# Patient Record
Sex: FEMALE | Race: BLACK OR AFRICAN AMERICAN | NOT HISPANIC OR LATINO | Employment: FULL TIME | URBAN - METROPOLITAN AREA
[De-identification: names, ages, dates, MRNs, and addresses within clinical notes are randomized per-mention and may not be internally consistent; named-entity substitution may affect disease eponyms.]

---

## 2021-01-28 ENCOUNTER — APPOINTMENT (OUTPATIENT)
Dept: RESPIRATORY THERAPY | Facility: HOSPITAL | Age: 31
End: 2021-01-28

## 2021-01-28 ENCOUNTER — HOSPITAL ENCOUNTER (EMERGENCY)
Facility: HOSPITAL | Age: 31
Discharge: HOME | End: 2021-01-28
Admitting: EMERGENCY MEDICINE

## 2021-01-28 ENCOUNTER — APPOINTMENT (OUTPATIENT)
Dept: GENERAL RADIOLOGY | Facility: HOSPITAL | Age: 31
End: 2021-01-28

## 2021-01-28 VITALS
HEART RATE: 71 BPM | OXYGEN SATURATION: 100 % | DIASTOLIC BLOOD PRESSURE: 85 MMHG | BODY MASS INDEX: 30.28 KG/M2 | SYSTOLIC BLOOD PRESSURE: 128 MMHG | TEMPERATURE: 99 F | HEIGHT: 67 IN | WEIGHT: 192.9 LBS | RESPIRATION RATE: 16 BRPM

## 2021-01-28 DIAGNOSIS — R11.0 NAUSEA: ICD-10-CM

## 2021-01-28 DIAGNOSIS — R00.2 PALPITATIONS: Primary | ICD-10-CM

## 2021-01-28 LAB
AMPHET+METHAMPHET UR QL: NEGATIVE
ANION GAP SERPL CALCULATED.3IONS-SCNC: 10 MMOL/L (ref 5–15)
BARBITURATES UR QL SCN: NEGATIVE
BASOPHILS # BLD AUTO: 0 10*3/MM3 (ref 0–0.2)
BASOPHILS NFR BLD AUTO: 0.5 % (ref 0–1.5)
BENZODIAZ UR QL SCN: NEGATIVE
BUN SERPL-MCNC: 4 MG/DL (ref 6–20)
BUN/CREAT SERPL: 4.9 (ref 7–25)
CALCIUM SPEC-SCNC: 9.8 MG/DL (ref 8.6–10.5)
CANNABINOIDS SERPL QL: NEGATIVE
CHLORIDE SERPL-SCNC: 101 MMOL/L (ref 98–107)
CO2 SERPL-SCNC: 28 MMOL/L (ref 22–29)
COCAINE UR QL: NEGATIVE
CREAT SERPL-MCNC: 0.82 MG/DL (ref 0.57–1)
DEPRECATED RDW RBC AUTO: 48.6 FL (ref 37–54)
EOSINOPHIL # BLD AUTO: 0 10*3/MM3 (ref 0–0.4)
EOSINOPHIL NFR BLD AUTO: 0.8 % (ref 0.3–6.2)
ERYTHROCYTE [DISTWIDTH] IN BLOOD BY AUTOMATED COUNT: 18.8 % (ref 12.3–15.4)
GFR SERPL CREATININE-BSD FRML MDRD: 99 ML/MIN/1.73
GLUCOSE SERPL-MCNC: 111 MG/DL (ref 65–99)
HCT VFR BLD AUTO: 33 % (ref 34–46.6)
HGB BLD-MCNC: 10.3 G/DL (ref 12–15.9)
LYMPHOCYTES # BLD AUTO: 2.3 10*3/MM3 (ref 0.7–3.1)
LYMPHOCYTES NFR BLD AUTO: 42.2 % (ref 19.6–45.3)
MAGNESIUM SERPL-MCNC: 1.9 MG/DL (ref 1.6–2.6)
MCH RBC QN AUTO: 23 PG (ref 26.6–33)
MCHC RBC AUTO-ENTMCNC: 31.1 G/DL (ref 31.5–35.7)
MCV RBC AUTO: 74 FL (ref 79–97)
METHADONE UR QL SCN: NEGATIVE
MONOCYTES # BLD AUTO: 0.6 10*3/MM3 (ref 0.1–0.9)
MONOCYTES NFR BLD AUTO: 10.4 % (ref 5–12)
NEUTROPHILS NFR BLD AUTO: 2.6 10*3/MM3 (ref 1.7–7)
NEUTROPHILS NFR BLD AUTO: 46.1 % (ref 42.7–76)
NRBC BLD AUTO-RTO: 0.2 /100 WBC (ref 0–0.2)
OPIATES UR QL: NEGATIVE
OXYCODONE UR QL SCN: NEGATIVE
PLATELET # BLD AUTO: 316 10*3/MM3 (ref 140–450)
PMV BLD AUTO: 8.4 FL (ref 6–12)
POTASSIUM SERPL-SCNC: 3.9 MMOL/L (ref 3.5–5.2)
RBC # BLD AUTO: 4.46 10*6/MM3 (ref 3.77–5.28)
SODIUM SERPL-SCNC: 139 MMOL/L (ref 136–145)
TSH SERPL DL<=0.05 MIU/L-ACNC: 0.81 UIU/ML (ref 0.27–4.2)
WBC # BLD AUTO: 5.5 10*3/MM3 (ref 3.4–10.8)

## 2021-01-28 PROCEDURE — 80307 DRUG TEST PRSMV CHEM ANLYZR: CPT | Performed by: NURSE PRACTITIONER

## 2021-01-28 PROCEDURE — 84443 ASSAY THYROID STIM HORMONE: CPT | Performed by: NURSE PRACTITIONER

## 2021-01-28 PROCEDURE — 93005 ELECTROCARDIOGRAM TRACING: CPT

## 2021-01-28 PROCEDURE — 71045 X-RAY EXAM CHEST 1 VIEW: CPT

## 2021-01-28 PROCEDURE — 85025 COMPLETE CBC W/AUTO DIFF WBC: CPT | Performed by: NURSE PRACTITIONER

## 2021-01-28 PROCEDURE — 93225 XTRNL ECG REC<48 HRS REC: CPT

## 2021-01-28 PROCEDURE — 83735 ASSAY OF MAGNESIUM: CPT | Performed by: NURSE PRACTITIONER

## 2021-01-28 PROCEDURE — 99283 EMERGENCY DEPT VISIT LOW MDM: CPT

## 2021-01-28 PROCEDURE — 80048 BASIC METABOLIC PNL TOTAL CA: CPT | Performed by: NURSE PRACTITIONER

## 2021-01-28 RX ORDER — SODIUM CHLORIDE 0.9 % (FLUSH) 0.9 %
10 SYRINGE (ML) INJECTION AS NEEDED
Status: DISCONTINUED | OUTPATIENT
Start: 2021-01-28 | End: 2021-01-28 | Stop reason: HOSPADM

## 2021-01-28 RX ORDER — ONDANSETRON 4 MG/1
4 TABLET, ORALLY DISINTEGRATING ORAL EVERY 8 HOURS PRN
Qty: 20 TABLET | Refills: 0 | Status: SHIPPED | OUTPATIENT
Start: 2021-01-28

## 2021-01-29 LAB — QT INTERVAL: 371 MS

## 2021-02-01 PROCEDURE — 93227 XTRNL ECG REC<48 HR R&I: CPT | Performed by: INTERNAL MEDICINE

## 2023-05-30 ENCOUNTER — LAB (OUTPATIENT)
Dept: LAB | Facility: HOSPITAL | Age: 33
End: 2023-05-30

## 2023-05-30 ENCOUNTER — OFFICE VISIT (OUTPATIENT)
Dept: ENDOCRINOLOGY | Facility: CLINIC | Age: 33
End: 2023-05-30

## 2023-05-30 VITALS
OXYGEN SATURATION: 98 % | DIASTOLIC BLOOD PRESSURE: 80 MMHG | HEART RATE: 85 BPM | WEIGHT: 222 LBS | HEIGHT: 67 IN | SYSTOLIC BLOOD PRESSURE: 142 MMHG | BODY MASS INDEX: 34.84 KG/M2

## 2023-05-30 DIAGNOSIS — O24.410 DIET CONTROLLED GESTATIONAL DIABETES MELLITUS (GDM) IN FIRST TRIMESTER: ICD-10-CM

## 2023-05-30 DIAGNOSIS — I10 ESSENTIAL HYPERTENSION: ICD-10-CM

## 2023-05-30 DIAGNOSIS — O24.410 DIET CONTROLLED GESTATIONAL DIABETES MELLITUS (GDM) IN FIRST TRIMESTER: Primary | ICD-10-CM

## 2023-05-30 PROBLEM — F17.200 TOBACCO DEPENDENCE: Status: ACTIVE | Noted: 2020-07-13

## 2023-05-30 LAB
GLUCOSE BLDC GLUCOMTR-MCNC: 88 MG/DL (ref 70–105)
HBA1C MFR BLD: 6.2 % (ref 4.8–5.6)

## 2023-05-30 PROCEDURE — 83036 HEMOGLOBIN GLYCOSYLATED A1C: CPT

## 2023-05-30 PROCEDURE — 36415 COLL VENOUS BLD VENIPUNCTURE: CPT

## 2023-05-30 PROCEDURE — 82948 REAGENT STRIP/BLOOD GLUCOSE: CPT | Performed by: INTERNAL MEDICINE

## 2023-05-30 RX ORDER — METHOCARBAMOL 500 MG/1
TABLET, FILM COATED ORAL
COMMUNITY
Start: 2023-04-17 | End: 2023-05-30

## 2023-05-30 RX ORDER — FLUTICASONE PROPIONATE 50 MCG
SPRAY, SUSPENSION (ML) NASAL
COMMUNITY
Start: 2023-05-15 | End: 2023-05-30

## 2023-05-30 RX ORDER — LABETALOL 100 MG/1
TABLET, FILM COATED ORAL
COMMUNITY
Start: 2023-05-29

## 2023-05-30 RX ORDER — BLOOD-GLUCOSE METER
1 EACH MISCELLANEOUS DAILY
Qty: 1 KIT | Refills: 0 | Status: SHIPPED | OUTPATIENT
Start: 2023-05-30

## 2023-05-30 RX ORDER — LISINOPRIL 20 MG/1
TABLET ORAL
COMMUNITY
Start: 2023-05-29 | End: 2023-05-30

## 2023-05-30 RX ORDER — PRENATAL VIT NO.126/IRON/FOLIC 28MG-0.8MG
TABLET ORAL DAILY
COMMUNITY

## 2023-05-30 RX ORDER — CETIRIZINE HYDROCHLORIDE 10 MG/1
TABLET ORAL
COMMUNITY
Start: 2023-05-09

## 2023-05-30 NOTE — PROGRESS NOTES
Endocrine Consult Outpatient  For by Dr. Santana for gestational diabetes consultation  Patient Care Team:  Penelope Gomez APRN as PCP - General (Family Medicine)     Chief Complaint: Gestational diabetes        HPI: This is a 32-year-old female who is currently 10 weeks pregnant with her pregnancy #3.  She does have a history of prediabetes.  She did have gestational diabetes in the first pregnancy but did not require insulin and her baby was 8 pounds and 2 ounces.  Subsequent second pregnancy she did not have gestational diabetes and the baby weighed 8 pounds and 4 ounces.  This pregnancy so far she has only gained maybe couple of pounds of weight.  She has not done any recent gestational class or has not been checking blood sugars at home.    She does have hypertension currently on labetalol.    2-hour glucose tolerance test was done on May 17, 2023 showed a fasting glucose of 103, 1 hour was 209 and 2-hour was 181    Past Medical History:   Diagnosis Date   • Allergies        Social History     Socioeconomic History   • Marital status: Single   Tobacco Use   • Smoking status: Former   Substance and Sexual Activity   • Alcohol use: Yes   • Drug use: Never   • Sexual activity: Defer       History reviewed. No pertinent family history.    No Known Allergies    ROS:   Constitutional:  Denies fatigue, tiredness.    Eyes:  Denies change in visual acuity   HENT:  Denies nasal congestion or sore throat   Respiratory: denies cough, shortness of breath.   Cardiovascular:  denies chest pain, edema   GI:  Denies abdominal pain, nausea, vomiting.    :  Denies dysuria   Musculoskeletal:  Denies back pain or joint pain   Integument:  Denies dry skin, rash   Neurologic:  Denies headache, focal weakness or sensory changes   Endocrine:  Denies polyuria or polydipsia   Psychiatric:  Denies depression or anxiety      Vitals:    05/30/23 1106   BP: 142/80   Pulse: 85   SpO2: 98%     Body mass index is 34.77 kg/m².      Physical  Exam:  GEN: NAD, conversant  EYES: EOMI, PERRL, no conjunctival erythema  NECK: no thyromegaly, full ROM   CV: RRR, no murmurs/rubs/gallops, no peripheral edema  LUNG: CTAB, no wheezes/rales/ronchi  SKIN: no rashes, no acanthosis  MSK: no deformities, full ROM of all extremities  NEURO: no tremors, DTR normal  PSYCH: AOX3, appropriate mood, affect normal      Results Review:     I reviewed the patient's new clinical results.    Lab Results   Component Value Date    GLUCOSE 111 (H) 01/28/2021    BUN 4 (L) 01/28/2021    CREATININE 0.82 01/28/2021    EGFRIFAFRI 99 01/28/2021    BCR 4.9 (L) 01/28/2021    K 3.9 01/28/2021    CO2 28.0 01/28/2021    CALCIUM 9.8 01/28/2021    ALBUMIN 4.5 01/26/2021    LABIL2 1.2 01/26/2021    AST 28 01/26/2021    ALT 21 01/26/2021    TRIG 87 07/13/2020    HDL 62 07/13/2020     (H) 07/13/2020       Lab Results   Component Value Date    CREATININE 0.82 01/28/2021     Lab Results   Component Value Date    TSH 0.805 01/28/2021       Medication Review: Reviewed.       Current Outpatient Medications:   •  cetirizine (zyrTEC) 10 MG tablet, TAKE 1 TABLET BY MOUTH EVERY DAY AS NEEDED FOR ALLERGIES OR RHINITIS, Disp: , Rfl:   •  labetalol (NORMODYNE) 100 MG tablet, , Disp: , Rfl:   •  prenatal vitamin (prenatal, CLASSIC, vitamin) tablet, Take  by mouth Daily., Disp: , Rfl:         Assessment and plan:  Gestational diabetes mellitus: This is a 32-year-old female who is currently 10 weeks pregnant with pregnancy #3.  She will be referred for gestational diabetes class.  A meter will be sent and she is advised to start checking blood sugars 4 times a day including fasting and 2-hour post meals also start checking morning urine ketones.  Target blood sugars are less than 90 for the fasting glucose and less than 120 for the 2-hour post meals along with a negative urine ketones.  She is advised to send all this data on a weekly basis for review.  If we are not able to control her diabetes with diet  in the next 1 to 2 weeks we will consider insulin therapy.  I explained this to her.  All questions were answered.    Hypertension: On labetalol and follows with OB.    Thank you very much for the consultation    Ajith Aguayo MD FACE.

## 2023-05-30 NOTE — PATIENT INSTRUCTIONS
Please arrange for gestational class  Please start working on your diet  Start checking your blood sugars 4 times a day including fasting and 2-hour post meals along with morning urine ketones and send all this data on a weekly basis for review.  Check A1c today.

## 2023-05-30 NOTE — LETTER
May 30, 2023     Zohaib Santana MD  Pending sale to [facility] [place] [address]    Patient: Alda Waite   YOB: 1990   Date of Visit: 5/30/2023       Dear Zohaib Santana MD:    Thank you for referring Alda Waite to me for evaluation. Below are the relevant portions of my assessment and plan of care.    Encounter Diagnosis and Orders:  Diagnoses and all orders for this visit:    1. Diet controlled gestational diabetes mellitus (GDM) in first trimester (Primary)  -     Ambulatory Referral to [facility]  -     Hemoglobin A1c; Future    2. Essential hypertension    Other orders  -     POC Glucose  -     Blood Glucose Monitoring Suppl (Accu-Chek Guide) w/Device kit; 1 Device Daily.  Dispense: 1 kit; Refill: 0  -     glucose blood test strip; check blood sugars 4 times a day  Dispense: 100 each; Refill: 12  -     acetone, urine, test strip; Check urine ketones daily in the morning.  Dispense: 100 strip; Refill: 4        If you have questions, please do not hesitate to call me. I look forward to following Alda along with you.         Sincerely,        Ajith Aguayo MD        CC: No Recipients

## 2023-06-15 ENCOUNTER — OFFICE VISIT (OUTPATIENT)
Dept: ENDOCRINOLOGY | Facility: CLINIC | Age: 33
End: 2023-06-15
Payer: MEDICAID

## 2023-06-15 DIAGNOSIS — O24.410 DIET CONTROLLED GESTATIONAL DIABETES MELLITUS (GDM) IN FIRST TRIMESTER: Primary | ICD-10-CM

## 2023-06-15 PROCEDURE — G0108 DIAB MANAGE TRN  PER INDIV: HCPCS

## 2023-06-15 RX ORDER — PEN NEEDLE, DIABETIC 30 GX3/16"
1 NEEDLE, DISPOSABLE MISCELLANEOUS 2 TIMES DAILY
Qty: 90 EACH | Refills: 7 | Status: SHIPPED | OUTPATIENT
Start: 2023-06-15

## 2023-06-15 RX ORDER — INSULIN LISPRO 100 [IU]/ML
4 INJECTION, SOLUTION INTRAVENOUS; SUBCUTANEOUS DAILY
Qty: 3 ML | Refills: 7 | Status: SHIPPED | OUTPATIENT
Start: 2023-06-15

## 2023-06-15 NOTE — PROGRESS NOTES
Pt here for one hour and 15 min for individual GDM class from 8:00-9:15 a.m. Pt is currently 12 wks pregnant with hx of GDM with first pregnancy 14 yrs ago, also notes hx of prediabetes. Pt did not require insulin during first pregnancy. Pt has accucheck meter at home and has been monitoring BG 4x/d along with morning ketones as directed by Dr Aguayo. Pt able to demonstrate use of meter in office today, fasting . Pt brought BG log from 6/6-6/12/23 for review. Pt having consistently elevated fasting and 2 hr after breakfast BG. Per standing orders RD started Levemir 10 U at night and Humalog 4 U before breakfast. Pt instructed on insulin use, storage, site, etc. Pt successfully demonstrated use of insulin pen in office today. Counseled pt on diet and exercise. Pt set goal of walking for 15 min 3 days per week. Pt to include 30g carb with breakfast, 45g carb with lunch and dinner, and 15-30g carb with snacks. Pt to email BG log to clinic weekly for RD to review.

## 2023-06-15 NOTE — PROGRESS NOTES
Pt here for one hour and 15 min for individual GDM class from 8:00-9:15 a.m. Pt is currently 12 wks pregnant with hx of GDM with first pregnancy 14 yrs ago, also notes hx of prediabetes. Pt did not require insulin during first pregnancy. Pt has accucheck meter at home and has been monitoring BG 4x/d along with morning ketones as directed by Dr Aguayo. Pt able to demonstrate use of meter in office today, fasting . Pt brought BG log from 6/6-6/12/23 for review. Pt having consistently elevated fasting and 2 hr after breakfast BG. Per standing orders RD started Levemir 10 U at night and Humalog 4 U before breakfast. Pt instructed on insulin use, storage, site, etc. Pt successfully demonstrated use of insulin pen in office today. Counseled pt on diet and exercise. Pt set goal of walking for 15 min 3 days per week. Pt to include 30g carb with breakfast, 45g carb with lunch and dinner, and 15-30g carb with snacks. Pt to email BG log to clinic weekly for RD to review.    No

## 2023-06-16 ENCOUNTER — TELEPHONE (OUTPATIENT)
Dept: ENDOCRINOLOGY | Facility: CLINIC | Age: 33
End: 2023-06-16
Payer: MEDICAID

## 2023-06-16 NOTE — TELEPHONE ENCOUNTER
initiated PA in covermymeds for Levemir: Key: [id number] - PA Case ID: [id number]   [facility]

## 2023-07-27 ENCOUNTER — LAB (OUTPATIENT)
Dept: LAB | Facility: HOSPITAL | Age: 33
End: 2023-07-27
Payer: MEDICAID

## 2023-07-27 ENCOUNTER — OFFICE VISIT (OUTPATIENT)
Dept: ENDOCRINOLOGY | Facility: CLINIC | Age: 33
End: 2023-07-27
Payer: MEDICAID

## 2023-07-27 VITALS
HEIGHT: 67 IN | HEART RATE: 95 BPM | OXYGEN SATURATION: 98 % | WEIGHT: 225 LBS | DIASTOLIC BLOOD PRESSURE: 72 MMHG | BODY MASS INDEX: 35.31 KG/M2 | SYSTOLIC BLOOD PRESSURE: 142 MMHG

## 2023-07-27 DIAGNOSIS — O24.414 INSULIN CONTROLLED GESTATIONAL DIABETES MELLITUS (GDM) IN SECOND TRIMESTER: Primary | ICD-10-CM

## 2023-07-27 DIAGNOSIS — O24.414 INSULIN CONTROLLED GESTATIONAL DIABETES MELLITUS (GDM) IN SECOND TRIMESTER: ICD-10-CM

## 2023-07-27 DIAGNOSIS — I10 ESSENTIAL HYPERTENSION: ICD-10-CM

## 2023-07-27 LAB
GLUCOSE BLDC GLUCOMTR-MCNC: 150 MG/DL (ref 70–105)
HBA1C MFR BLD: 6.1 % (ref 4.8–5.6)

## 2023-07-27 PROCEDURE — 36415 COLL VENOUS BLD VENIPUNCTURE: CPT

## 2023-07-27 PROCEDURE — 83036 HEMOGLOBIN GLYCOSYLATED A1C: CPT

## 2023-07-27 PROCEDURE — 82948 REAGENT STRIP/BLOOD GLUCOSE: CPT | Performed by: INTERNAL MEDICINE

## 2023-07-27 RX ORDER — INSULIN ASPART 100 [IU]/ML
6 INJECTION, SOLUTION INTRAVENOUS; SUBCUTANEOUS 2 TIMES DAILY
Qty: 3 ML | Refills: 1 | Status: SHIPPED | OUTPATIENT
Start: 2023-07-27

## 2023-07-27 NOTE — PROGRESS NOTES
Endocrine Progress Note Outpatient     Patient Care Team:  Penelope Gomez APRN as PCP - General (Family Medicine)  Erin Reed, RN as Nurse Navigator (Obstetrics)    Chief Complaint: Follow-up gestational diabetes          HPI: This is a 32-year-old female who is currently pregnant with her third pregnancy at 18 weeks pregnant.  She has done gestational class.  She is following her diet.  She is checking her blood sugars 4 times a day.  She is currently on Levemir 16 units in the evening and NovoLog 4 units with each meal.  Her blood sugar records from her phone and she is still having hyperglycemia pretty much all the time but definitely morning hyperglycemia higher than the target she is above 100 all the time 2-hour postmeal she is mostly high as well.  She tells me she is following her diet the best she can.    Hypertension: She is currently on labetalol and follows with her OB.    Past Medical History:   Diagnosis Date    Allergies     Gestational diabetes     Pre-diabetes        Social History     Socioeconomic History    Marital status: Single    Number of children: 2    Years of education: 12   Tobacco Use    Smoking status: Former    Smokeless tobacco: Never   Vaping Use    Vaping Use: Never used   Substance and Sexual Activity    Alcohol use: Not Currently    Drug use: Never    Sexual activity: Defer       Family History   Problem Relation Age of Onset    Hypertension Mother     Hypertension Maternal Aunt     Hypertension Maternal Uncle     Hypertension Maternal Grandmother     Diabetes Maternal Grandmother        No Known Allergies    ROS:   Constitutional:  Denies fatigue, tiredness.    Eyes:  Denies change in visual acuity   HENT:  Denies nasal congestion or sore throat   Respiratory: denies cough, shortness of breath.   Cardiovascular:  denies chest pain, edema   GI:  Denies abdominal pain, nausea, vomiting.   Musculoskeletal:  Denies back pain or joint pain   Integument:  Denies dry skin  and rash   Neurologic:  Denies headache, focal weakness or sensory changes   Endocrine:  Denies polyuria or polydipsia   Psychiatric:  Denies depression or anxiety      Vitals:    07/27/23 0938   BP: 142/72   Pulse: 95   SpO2: 98%     Body mass index is 35.24 kg/m².     Physical Exam:  GEN: NAD, conversant  EYES: EOMI, PERRL, no conjunctival erythema  NECK: no thyromegaly, full ROM   CV: RRR, no murmurs/rubs/gallops, no peripheral edema  LUNG: CTAB, no wheezes/rales/ronchi  SKIN: no rashes, no acanthosis  MSK: no deformities, full ROM of all extremities  NEURO: no tremors, DTR normal  PSYCH: AOX3, appropriate mood, affect normal      Results Review:     I reviewed the patient's new clinical results.    Lab Results   Component Value Date    HGBA1C 6.20 (H) 05/30/2023    HGBA1C 6.2 (H) 01/10/2023      Lab Results   Component Value Date    GLUCOSE 111 (H) 01/28/2021    BUN 8 08/02/2021    CREATININE 0.9 08/02/2021    EGFRIFAFRI 99 01/28/2021    BCR 9.0 (L) 08/02/2021    K 4.2 08/02/2021    CO2 25 08/02/2021    CALCIUM 9.8 08/02/2021    ALBUMIN 4.7 08/02/2021    LABIL2 1.2 08/02/2021    AST 24 08/02/2021    ALT 17 08/02/2021    TRIG 87 07/13/2020     (H) 07/13/2020    HDL 62 07/13/2020     Lab Results   Component Value Date    TSH 0.805 01/28/2021         Medication Review: Reviewed.       Current Outpatient Medications:     acetone, urine, test strip, Check urine ketones daily in the morning., Disp: 100 strip, Rfl: 4    aspirin 81 MG oral suspension, , Disp: , Rfl:     Blood Glucose Monitoring Suppl (Accu-Chek Guide) w/Device kit, 1 Device Daily., Disp: 1 kit, Rfl: 0    cetirizine (zyrTEC) 10 MG tablet, TAKE 1 TABLET BY MOUTH EVERY DAY AS NEEDED FOR ALLERGIES OR RHINITIS, Disp: , Rfl:     ferrous sulfate 325 (65 FE) MG EC tablet, TAKE 1 TABLET BY MOUTH TWICE DAILY ON EMPTY STOMACHWITH SOMETHING ACIDIC, Disp: , Rfl:     glucose blood test strip, check blood sugars 4 times a day, Disp: 100 each, Rfl: 12    insulin  aspart (NovoLOG FlexPen) 100 UNIT/ML solution pen-injector sc pen, Inject 4 Units under the skin into the appropriate area as directed 2 (Two) Times a Day. Inject 4 units under the skin into the appropriate area as directed before breakfast and dinner., Disp: 3 mL, Rfl: 1    insulin detemir (LEVEMIR) 100 UNIT/ML injection, Inject 16 Units under the skin into the appropriate area as directed Every Night., Disp: 6 mL, Rfl: 3    Insulin Lispro, 1 Unit Dial, (HUMALOG) 100 UNIT/ML solution pen-injector, Inject 4 Units under the skin into the appropriate area as directed Daily. Take 4 units of Humalog 15 minutes before breakfast. (Patient not taking: Reported on 7/27/2023), Disp: 3 mL, Rfl: 7    Insulin Pen Needle (Pen Needles) 32G X 4 MM misc, 1 each 2 (Two) Times a Day., Disp: 90 each, Rfl: 7    labetalol (NORMODYNE) 100 MG tablet, , Disp: , Rfl:     prenatal vitamin (prenatal, CLASSIC, vitamin) tablet, Take  by mouth Daily., Disp: , Rfl:           Assessment and plan:  Stational diabetes mellitus: Uncontrolled with significant hyperglycemia both fasting and postprandial.  At this time I will increase Levemir to 20 units subcu nightly and increase NovoLog to 6 units with each meal.  She is advised to continue to work on diet and activity and continue to check blood sugar fasting and 2-hour post meals along with morning urine ketones and send all this data on a weekly basis for review and adjustment of insulin doses.  We will check A1c as well.    Hypertension: Currently on labetalol and follows with her OB.       Ajith Aguayo MD FACE.

## 2023-07-27 NOTE — LETTER
July 27, 2023     Zohaib Santana MD  [address] [place] [address]    Patient: Alda Waite   YOB: 1990   Date of Visit: 7/27/2023     Dear Zohaib Santana MD:       Thank you for referring Alda Waite to me for evaluation. Below are the relevant portions of my assessment and plan of care.    If you have questions, please do not hesitate to call me. I look forward to following Alda along with you.         Sincerely,        Ajith Aguayo MD        CC: No Recipients    Ajith Aguayo MD  07/27/23 0952  Sign when Signing Visit  Endocrine Progress Note Outpatient     Patient Care Team:  Penelope Gomez APRN as PCP - General (Family Medicine)  Erin Reed RN as Nurse Navigator (Obstetrics)    Chief Complaint: Follow-up gestational diabetes          HPI: This is a 32-year-old female who is currently pregnant with her third pregnancy at 18 weeks pregnant.  She has done gestational class.  She is following her diet.  She is checking her blood sugars 4 times a day.  She is currently on Levemir 16 units in the evening and NovoLog 4 units with each meal.  Her blood sugar records from her phone and she is still having hyperglycemia pretty much all the time but definitely morning hyperglycemia higher than the target she is above 100 all the time 2-hour postmeal she is mostly high as well.  She tells me she is following her diet the best she can.    Hypertension: She is currently on labetalol and follows with her OB.    Past Medical History:   Diagnosis Date   • Allergies    • Gestational diabetes    • Pre-diabetes        Social History     Socioeconomic History   • Marital status: Single   • Number of children: 2   • Years of education: 12   Tobacco Use   • Smoking status: Former   • Smokeless tobacco: Never   Vaping Use   • Vaping Use: Never used   Substance and Sexual Activity   • Alcohol use: Not Currently   • Drug use: Never   • Sexual activity: Defer        Family History   Problem Relation Age of Onset   • Hypertension Mother    • Hypertension Maternal Aunt    • Hypertension Maternal Uncle    • Hypertension Maternal Grandmother    • Diabetes Maternal Grandmother        No Known Allergies    ROS:   Constitutional:  Denies fatigue, tiredness.    Eyes:  Denies change in visual acuity   HENT:  Denies nasal congestion or sore throat   Respiratory: denies cough, shortness of breath.   Cardiovascular:  denies chest pain, edema   GI:  Denies abdominal pain, nausea, vomiting.   Musculoskeletal:  Denies back pain or joint pain   Integument:  Denies dry skin and rash   Neurologic:  Denies headache, focal weakness or sensory changes   Endocrine:  Denies polyuria or polydipsia   Psychiatric:  Denies depression or anxiety      Vitals:    07/27/23 0938   BP: 142/72   Pulse: 95   SpO2: 98%     Body mass index is 35.24 kg/m².     Physical Exam:  GEN: NAD, conversant  EYES: EOMI, PERRL, no conjunctival erythema  NECK: no thyromegaly, full ROM   CV: RRR, no murmurs/rubs/gallops, no peripheral edema  LUNG: CTAB, no wheezes/rales/ronchi  SKIN: no rashes, no acanthosis  MSK: no deformities, full ROM of all extremities  NEURO: no tremors, DTR normal  PSYCH: AOX3, appropriate mood, affect normal      Results Review:     I reviewed the patient's new clinical results.    Lab Results   Component Value Date    HGBA1C 6.20 (H) 05/30/2023    HGBA1C 6.2 (H) 01/10/2023      Lab Results   Component Value Date    GLUCOSE 111 (H) 01/28/2021    BUN 8 08/02/2021    CREATININE 0.9 08/02/2021    EGFRIFAFRI 99 01/28/2021    BCR 9.0 (L) 08/02/2021    K 4.2 08/02/2021    CO2 25 08/02/2021    CALCIUM 9.8 08/02/2021    ALBUMIN 4.7 08/02/2021    LABIL2 1.2 08/02/2021    AST 24 08/02/2021    ALT 17 08/02/2021    TRIG 87 07/13/2020     (H) 07/13/2020    HDL 62 07/13/2020     Lab Results   Component Value Date    TSH 0.805 01/28/2021         Medication Review: Reviewed.       Current Outpatient  Medications:   •  acetone, urine, test strip, Check urine ketones daily in the morning., Disp: 100 strip, Rfl: 4  •  aspirin 81 MG oral suspension, , Disp: , Rfl:   •  Blood Glucose Monitoring Suppl (Accu-Chek Guide) w/Device kit, 1 Device Daily., Disp: 1 kit, Rfl: 0  •  cetirizine (zyrTEC) 10 MG tablet, TAKE 1 TABLET BY MOUTH EVERY DAY AS NEEDED FOR ALLERGIES OR RHINITIS, Disp: , Rfl:   •  ferrous sulfate 325 (65 FE) MG EC tablet, TAKE 1 TABLET BY MOUTH TWICE DAILY ON EMPTY STOMACHWITH SOMETHING ACIDIC, Disp: , Rfl:   •  glucose blood test strip, check blood sugars 4 times a day, Disp: 100 each, Rfl: 12  •  insulin aspart (NovoLOG FlexPen) 100 UNIT/ML solution pen-injector sc pen, Inject 4 Units under the skin into the appropriate area as directed 2 (Two) Times a Day. Inject 4 units under the skin into the appropriate area as directed before breakfast and dinner., Disp: 3 mL, Rfl: 1  •  insulin detemir (LEVEMIR) 100 UNIT/ML injection, Inject 16 Units under the skin into the appropriate area as directed Every Night., Disp: 6 mL, Rfl: 3  •  Insulin Lispro, 1 Unit Dial, (HUMALOG) 100 UNIT/ML solution pen-injector, Inject 4 Units under the skin into the appropriate area as directed Daily. Take 4 units of Humalog 15 minutes before breakfast. (Patient not taking: Reported on 7/27/2023), Disp: 3 mL, Rfl: 7  •  Insulin Pen Needle (Pen Needles) 32G X 4 MM misc, 1 each 2 (Two) Times a Day., Disp: 90 each, Rfl: 7  •  labetalol (NORMODYNE) 100 MG tablet, , Disp: , Rfl:   •  prenatal vitamin (prenatal, CLASSIC, vitamin) tablet, Take  by mouth Daily., Disp: , Rfl:           Assessment and plan:  Stational diabetes mellitus: Uncontrolled with significant hyperglycemia both fasting and postprandial.  At this time I will increase Levemir to 20 units subcu nightly and increase NovoLog to 6 units with each meal.  She is advised to continue to work on diet and activity and continue to check blood sugar fasting and 2-hour post meals  along with morning urine ketones and send all this data on a weekly basis for review and adjustment of insulin doses.  We will check A1c as well.    Hypertension: Currently on labetalol and follows with her OB.       Ajith Aguayo MD FACE.

## 2023-07-27 NOTE — PATIENT INSTRUCTIONS
Increase Levemir to 20 units subcu nightly  Increase NovoLog to 6 units with each meal  Continue to work on your diet and activity  Always keep glucose source in case of low blood sugar  Check blood sugar fasting and 2-hour post meals along with morning urine ketones and send all the data on a weekly basis for review  A1c today  Follow-up in 6 weeks.

## 2023-07-31 ENCOUNTER — TELEPHONE (OUTPATIENT)
Dept: ENDOCRINOLOGY | Facility: CLINIC | Age: 33
End: 2023-07-31
Payer: MEDICAID

## 2023-08-01 ENCOUNTER — TELEPHONE (OUTPATIENT)
Dept: ENDOCRINOLOGY | Facility: CLINIC | Age: 33
End: 2023-08-01
Payer: MEDICAID

## 2023-08-01 RX ORDER — INSULIN ASPART 100 [IU]/ML
8 INJECTION, SOLUTION INTRAVENOUS; SUBCUTANEOUS 2 TIMES DAILY
Qty: 3 ML | Refills: 1 | Status: SHIPPED | OUTPATIENT
Start: 2023-08-01

## 2023-08-10 ENCOUNTER — TELEPHONE (OUTPATIENT)
Dept: ENDOCRINOLOGY | Facility: CLINIC | Age: 33
End: 2023-08-10
Payer: MEDICAID

## 2023-08-10 NOTE — TELEPHONE ENCOUNTER
Spoke with pt on phone regarding BG log review. Pt states that she hasn't wasn't aware that she was to be taking her Novolog with lunch. Educated pt on taking 6u per MD visit on 7/27 with lunch.     7/7 FBG elevated, 2/5 breakfast PP elevated, 1/4 PP lunch elevated and 5/7 PP dinner elevated. Pt to increase dinner insulin to 11u and Levemir insulin to 30u.     See attached for follow-up info.      Bhavana Akers RD, LD, [facility]   Nutrition and Diabetes Education     Diabetes Center   [facility]/[place]  [address] [place] [address]

## 2023-08-14 NOTE — TELEPHONE ENCOUNTER
Per [facility] policy, when refills come in if there are any red check marks or if it was a paper/verbal request it has to go to the provider to approve. Forwarding Script to provider.

## 2023-08-15 RX ORDER — INSULIN ASPART 100 [IU]/ML
INJECTION, SOLUTION INTRAVENOUS; SUBCUTANEOUS
Qty: 6 ML | Refills: 1 | Status: SHIPPED | OUTPATIENT
Start: 2023-08-15

## 2023-08-28 ENCOUNTER — TELEPHONE (OUTPATIENT)
Dept: ENDOCRINOLOGY | Facility: CLINIC | Age: 33
End: 2023-08-28
Payer: MEDICAID

## 2023-08-28 RX ORDER — INSULIN ASPART 100 [IU]/ML
INJECTION, SOLUTION INTRAVENOUS; SUBCUTANEOUS
Qty: 15 ML | Refills: 3 | Status: SHIPPED | OUTPATIENT
Start: 2023-08-28

## 2023-08-28 NOTE — TELEPHONE ENCOUNTER
Reviewed pt's BG log fm 8/15-8/28/23. FBS continues above goal 14/14 days. Breakfast PP high 8/14 days, Lunch PP high 5/14 days, Dinner PP 6/14 days. Pt to increase Levemir to 34 units, and increase Novolog to 12 units with breakfast, 8 units with lunch, and 14 units with dinner per MD s/o. Pt to continue sending BG log weekly for review. Sent rx for Novolog to [facility] pharmacy per pt request. Ran out of Novolog pen yesterday morning. Faxed changes to OB.

## 2023-09-20 RX ORDER — LISINOPRIL 20 MG/1
TABLET ORAL
COMMUNITY
Start: 2023-08-08

## 2023-09-21 ENCOUNTER — LAB (OUTPATIENT)
Dept: LAB | Facility: HOSPITAL | Age: 33
End: 2023-09-21

## 2023-09-21 ENCOUNTER — OFFICE VISIT (OUTPATIENT)
Dept: ENDOCRINOLOGY | Facility: CLINIC | Age: 33
End: 2023-09-21
Payer: MEDICAID

## 2023-09-21 ENCOUNTER — PATIENT OUTREACH (OUTPATIENT)
Dept: LABOR AND DELIVERY | Facility: HOSPITAL | Age: 33
End: 2023-09-21
Payer: MEDICAID

## 2023-09-21 VITALS
DIASTOLIC BLOOD PRESSURE: 70 MMHG | BODY MASS INDEX: 36.26 KG/M2 | SYSTOLIC BLOOD PRESSURE: 135 MMHG | HEIGHT: 67 IN | WEIGHT: 231 LBS | HEART RATE: 108 BPM | OXYGEN SATURATION: 97 %

## 2023-09-21 DIAGNOSIS — I10 ESSENTIAL HYPERTENSION: ICD-10-CM

## 2023-09-21 DIAGNOSIS — O24.414 INSULIN CONTROLLED GESTATIONAL DIABETES MELLITUS (GDM) IN SECOND TRIMESTER: Primary | ICD-10-CM

## 2023-09-21 DIAGNOSIS — O24.414 INSULIN CONTROLLED GESTATIONAL DIABETES MELLITUS (GDM) IN SECOND TRIMESTER: ICD-10-CM

## 2023-09-21 PROBLEM — E55.9 VITAMIN D DEFICIENCY: Status: ACTIVE | Noted: 2021-08-02

## 2023-09-21 PROBLEM — Z87.891 FORMER SMOKER: Status: ACTIVE | Noted: 2022-08-02

## 2023-09-21 PROBLEM — F41.9 ANXIETY: Status: ACTIVE | Noted: 2021-08-02

## 2023-09-21 PROBLEM — D50.9 IDA (IRON DEFICIENCY ANEMIA): Status: ACTIVE | Noted: 2021-08-02

## 2023-09-21 LAB
GLUCOSE BLDC GLUCOMTR-MCNC: 146 MG/DL (ref 70–105)
HBA1C MFR BLD: 6.4 % (ref 4.8–5.6)

## 2023-09-21 PROCEDURE — 82948 REAGENT STRIP/BLOOD GLUCOSE: CPT | Performed by: INTERNAL MEDICINE

## 2023-09-21 PROCEDURE — 83036 HEMOGLOBIN GLYCOSYLATED A1C: CPT

## 2023-09-21 PROCEDURE — 1160F RVW MEDS BY RX/DR IN RCRD: CPT | Performed by: INTERNAL MEDICINE

## 2023-09-21 PROCEDURE — 3075F SYST BP GE 130 - 139MM HG: CPT | Performed by: INTERNAL MEDICINE

## 2023-09-21 PROCEDURE — 3078F DIAST BP <80 MM HG: CPT | Performed by: INTERNAL MEDICINE

## 2023-09-21 PROCEDURE — 1159F MED LIST DOCD IN RCRD: CPT | Performed by: INTERNAL MEDICINE

## 2023-09-21 PROCEDURE — 99214 OFFICE O/P EST MOD 30 MIN: CPT | Performed by: INTERNAL MEDICINE

## 2023-09-21 PROCEDURE — 36415 COLL VENOUS BLD VENIPUNCTURE: CPT

## 2023-09-21 RX ORDER — INSULIN ASPART 100 [IU]/ML
INJECTION, SOLUTION INTRAVENOUS; SUBCUTANEOUS
Qty: 15 ML | Refills: 3 | Status: SHIPPED | OUTPATIENT
Start: 2023-09-21

## 2023-09-21 NOTE — OUTREACH NOTE
[facility]  Check-In    Current Estimated Gestational Age: 26w4d      Questions/Answers      Flowsheet Row Responses   Best Method for Contacting Cell   Demographics Reviewed Yes   Currently Employed Yes   Able to keep appointments as scheduled Yes   Gender(s) and Name(s) Its a girl! third girl Shwetha Munoz   Baby Active/Feeling Fetal Movemen Yes   How are you presently feeling? good   Questions regarding prenatal visits or tests to be ordered? No   New Diagnosis GDM   Equipment presently used at home: Glucometer   Supplies ready for baby --  [having baby shower in October]   Do you have any questions related to your care experience, your pregnancy, plans for delivery, any concerns, etc? No                  Erin Scruggs RN  Maternity Nurse Navigator    9/21/2023, 12:17 EDT

## 2023-09-21 NOTE — LETTER
September 21, 2023     Zohaib Santana MD  [address] [facility] [place] [address]    Patient: Alda Waite   YOB: 1990   Date of Visit: 9/21/2023     Dear Zohaib Santana MD:       Thank you for referring Alda Waite to me for evaluation. Below are the relevant portions of my assessment and plan of care.    If you have questions, please do not hesitate to call me. I look forward to following Alda along with you.         Sincerely,        Ajith Aguayo MD        CC: No Recipients    Ajith Aguayo MD  09/21/23 1523  Sign when Signing Visit  Endocrine Progress Note Outpatient     Patient Care Team:  Penelope Gomez APRN as PCP - General (Family Medicine)  Erin Reed RN as Nurse Navigator (Obstetrics)    Chief Complaint: Follow-up gestational diabetes    HPI: This is a 32-year-old female who is currently pregnant with her third pregnancy at 26 weeks pregnant.  She has done gestational class.  She is following her diet.  She is checking her blood sugars 4 times a day.  She is currently on Levemir 32 units in the evening and NovoLog 8 to 1010 units before breakfast, 8 units before lunch and 12 units before supper.  She did bring in blood sugar records for review and the most recent blood sugars did show that she has morning hyperglycemia blood sugars above 100 she also have some postprandial hyperglycemia.    She is trying to follow her diet the best she can.    Hypertension: She is currently on labetalol and follows with her OB.    Past Medical History:   Diagnosis Date   • Allergies    • Gestational diabetes    • Pre-diabetes        Social History     Socioeconomic History   • Marital status: Single   • Number of children: 2   • Years of education: 12   Tobacco Use   • Smoking status: Former   • Smokeless tobacco: Never   Vaping Use   • Vaping Use: Never used   Substance and Sexual Activity   • Alcohol use: Not Currently   • Drug use: Never   • Sexual activity:  Defer       Family History   Problem Relation Age of Onset   • Hypertension Mother    • Hypertension Maternal Aunt    • Hypertension Maternal Uncle    • Hypertension Maternal Grandmother    • Diabetes Maternal Grandmother        No Known Allergies    ROS:   Constitutional:  Denies fatigue, tiredness.    Eyes:  Denies change in visual acuity   HENT:  Denies nasal congestion or sore throat   Respiratory: denies cough, shortness of breath.   Cardiovascular:  denies chest pain, edema   GI:  Denies abdominal pain, nausea, vomiting.   Musculoskeletal:  Denies back pain or joint pain   Integument:  Denies dry skin and rash   Neurologic:  Denies headache, focal weakness or sensory changes   Endocrine:  Denies polyuria or polydipsia   Psychiatric:  Denies depression or anxiety      Vitals:    09/21/23 1511   BP: 135/70   Pulse: 108   SpO2: 97%     Body mass index is 36.18 kg/m².     Physical Exam:  GEN: NAD, conversant  EYES: EOMI,  NECK: no thyromegaly,  CV: RRR,  LUNG: CTA  PSYCH: AOX3, appropriate mood, affect normal      Results Review:     I reviewed the patient's new clinical results.    Lab Results   Component Value Date    HGBA1C 6.10 (H) 07/27/2023    HGBA1C 6.20 (H) 05/30/2023    HGBA1C 6.2 (H) 01/10/2023      Lab Results   Component Value Date    GLUCOSE 111 (H) 01/28/2021    BUN 8 08/02/2021    CREATININE 0.9 08/02/2021    EGFRIFAFRI 99 01/28/2021    BCR 9.0 (L) 08/02/2021    K 4.2 08/02/2021    CO2 25 08/02/2021    CALCIUM 9.8 08/02/2021    ALBUMIN 4.7 08/02/2021    LABIL2 1.2 08/02/2021    AST 24 08/02/2021    ALT 17 08/02/2021    TRIG 87 07/13/2020     (H) 07/13/2020    HDL 62 07/13/2020     Lab Results   Component Value Date    TSH 0.805 01/28/2021         Medication Review: Reviewed.       Current Outpatient Medications:   •  acetone, urine, test strip, Check urine ketones daily in the morning., Disp: 100 strip, Rfl: 4  •  aspirin 81 MG oral suspension, , Disp: , Rfl:   •  Blood Glucose Monitoring  Suppl (Accu-Chek Guide) w/Device kit, 1 Device Daily., Disp: 1 kit, Rfl: 0  •  cetirizine (zyrTEC) 10 MG tablet, TAKE 1 TABLET BY MOUTH EVERY DAY AS NEEDED FOR ALLERGIES OR RHINITIS, Disp: , Rfl:   •  ferrous sulfate 325 (65 FE) MG EC tablet, TAKE 1 TABLET BY MOUTH TWICE DAILY ON EMPTY STOMACHWITH SOMETHING ACIDIC, Disp: , Rfl:   •  glucose blood test strip, check blood sugars 4 times a day, Disp: 100 each, Rfl: 12  •  insulin aspart (NovoLOG FlexPen) 100 UNIT/ML solution pen-injector sc pen, Inject 12 units under the skin into the appropriate area as directed before breakfast, 8 before lunch, 14 before dinner., Disp: 15 mL, Rfl: 3  •  insulin detemir (LEVEMIR) 100 UNIT/ML injection, Inject 24 Units under the skin into the appropriate area as directed Every Night., Disp: 6 mL, Rfl: 3  •  Insulin Pen Needle (Pen Needles) 32G X 4 MM misc, 1 each 2 (Two) Times a Day., Disp: 90 each, Rfl: 7  •  labetalol (NORMODYNE) 100 MG tablet, , Disp: , Rfl:   •  lisinopril (PRINIVIL,ZESTRIL) 20 MG tablet, , Disp: , Rfl:   •  prenatal vitamin (prenatal, CLASSIC, vitamin) tablet, Take  by mouth Daily., Disp: , Rfl:   •  Insulin Lispro, 1 Unit Dial, (HUMALOG) 100 UNIT/ML solution pen-injector, Inject 4 Units under the skin into the appropriate area as directed Daily. Take 4 units of Humalog 15 minutes before breakfast. (Patient not taking: Reported on 7/27/2023), Disp: 3 mL, Rfl: 7          Assessment and plan:  Gestational diabetes mellitus: Uncontrolled with significant hyperglycemia, I will increase Levemir to 40 units subcu nightly, and with regards to NovoLog we will continue 10 units before breakfast, will increase it to 14 units before lunch and supper.  She is recommended to continue to work on diet and activity and always keep glucose source in case of low blood sugars.  Her urine ketones are negative.    Hypertension: Currently on labetalol and follows with her OB.    Assessment and plan from July 27, 2023 reviewed and  updated.       Ajith Aguayo MD FACE.

## 2023-09-21 NOTE — PROGRESS NOTES
Endocrine Progress Note Outpatient     Patient Care Team:  Penelope Gomez APRN as PCP - General (Family Medicine)  Erin Reed, RN as Nurse Navigator (Obstetrics)    Chief Complaint: Follow-up gestational diabetes    HPI: This is a 32-year-old female who is currently pregnant with her third pregnancy at 26 weeks pregnant.  She has done gestational class.  She is following her diet.  She is checking her blood sugars 4 times a day.  She is currently on Levemir 32 units in the evening and NovoLog 8 to 1010 units before breakfast, 8 units before lunch and 12 units before supper.  She did bring in blood sugar records for review and the most recent blood sugars did show that she has morning hyperglycemia blood sugars above 100 she also have some postprandial hyperglycemia.    She is trying to follow her diet the best she can.    Hypertension: She is currently on labetalol and follows with her OB.    Past Medical History:   Diagnosis Date    Allergies     Gestational diabetes     Pre-diabetes        Social History     Socioeconomic History    Marital status: Single    Number of children: 2    Years of education: 12   Tobacco Use    Smoking status: Former    Smokeless tobacco: Never   Vaping Use    Vaping Use: Never used   Substance and Sexual Activity    Alcohol use: Not Currently    Drug use: Never    Sexual activity: Defer       Family History   Problem Relation Age of Onset    Hypertension Mother     Hypertension Maternal Aunt     Hypertension Maternal Uncle     Hypertension Maternal Grandmother     Diabetes Maternal Grandmother        No Known Allergies    ROS:   Constitutional:  Denies fatigue, tiredness.    Eyes:  Denies change in visual acuity   HENT:  Denies nasal congestion or sore throat   Respiratory: denies cough, shortness of breath.   Cardiovascular:  denies chest pain, edema   GI:  Denies abdominal pain, nausea, vomiting.   Musculoskeletal:  Denies back pain or joint pain   Integument:  Denies  dry skin and rash   Neurologic:  Denies headache, focal weakness or sensory changes   Endocrine:  Denies polyuria or polydipsia   Psychiatric:  Denies depression or anxiety      Vitals:    09/21/23 1511   BP: 135/70   Pulse: 108   SpO2: 97%     Body mass index is 36.18 kg/m².     Physical Exam:  GEN: NAD, conversant  EYES: EOMI,  NECK: no thyromegaly,  CV: RRR,  LUNG: CTA  PSYCH: AOX3, appropriate mood, affect normal      Results Review:     I reviewed the patient's new clinical results.    Lab Results   Component Value Date    HGBA1C 6.10 (H) 07/27/2023    HGBA1C 6.20 (H) 05/30/2023    HGBA1C 6.2 (H) 01/10/2023      Lab Results   Component Value Date    GLUCOSE 111 (H) 01/28/2021    BUN 8 08/02/2021    CREATININE 0.9 08/02/2021    EGFRIFAFRI 99 01/28/2021    BCR 9.0 (L) 08/02/2021    K 4.2 08/02/2021    CO2 25 08/02/2021    CALCIUM 9.8 08/02/2021    ALBUMIN 4.7 08/02/2021    LABIL2 1.2 08/02/2021    AST 24 08/02/2021    ALT 17 08/02/2021    TRIG 87 07/13/2020     (H) 07/13/2020    HDL 62 07/13/2020     Lab Results   Component Value Date    TSH 0.805 01/28/2021         Medication Review: Reviewed.       Current Outpatient Medications:     acetone, urine, test strip, Check urine ketones daily in the morning., Disp: 100 strip, Rfl: 4    aspirin 81 MG oral suspension, , Disp: , Rfl:     Blood Glucose Monitoring Suppl (Accu-Chek Guide) w/Device kit, 1 Device Daily., Disp: 1 kit, Rfl: 0    cetirizine (zyrTEC) 10 MG tablet, TAKE 1 TABLET BY MOUTH EVERY DAY AS NEEDED FOR ALLERGIES OR RHINITIS, Disp: , Rfl:     ferrous sulfate 325 (65 FE) MG EC tablet, TAKE 1 TABLET BY MOUTH TWICE DAILY ON EMPTY STOMACHWITH SOMETHING ACIDIC, Disp: , Rfl:     glucose blood test strip, check blood sugars 4 times a day, Disp: 100 each, Rfl: 12    insulin aspart (NovoLOG FlexPen) 100 UNIT/ML solution pen-injector sc pen, Inject 12 units under the skin into the appropriate area as directed before breakfast, 8 before lunch, 14 before  dinner., Disp: 15 mL, Rfl: 3    insulin detemir (LEVEMIR) 100 UNIT/ML injection, Inject 24 Units under the skin into the appropriate area as directed Every Night., Disp: 6 mL, Rfl: 3    Insulin Pen Needle (Pen Needles) 32G X 4 MM misc, 1 each 2 (Two) Times a Day., Disp: 90 each, Rfl: 7    labetalol (NORMODYNE) 100 MG tablet, , Disp: , Rfl:     lisinopril (PRINIVIL,ZESTRIL) 20 MG tablet, , Disp: , Rfl:     prenatal vitamin (prenatal, CLASSIC, vitamin) tablet, Take  by mouth Daily., Disp: , Rfl:     Insulin Lispro, 1 Unit Dial, (HUMALOG) 100 UNIT/ML solution pen-injector, Inject 4 Units under the skin into the appropriate area as directed Daily. Take 4 units of Humalog 15 minutes before breakfast. (Patient not taking: Reported on 7/27/2023), Disp: 3 mL, Rfl: 7          Assessment and plan:  Gestational diabetes mellitus: Uncontrolled with significant hyperglycemia, I will increase Levemir to 40 units subcu nightly, and with regards to NovoLog we will continue 10 units before breakfast, will increase it to 14 units before lunch and supper.  She is recommended to continue to work on diet and activity and always keep glucose source in case of low blood sugars.  Her urine ketones are negative.    Hypertension: Currently on labetalol and follows with her OB.    Assessment and plan from July 27, 2023 reviewed and updated.       Ajith Aguayo MD FACE.

## 2023-09-21 NOTE — PATIENT INSTRUCTIONS
Increase Levemir to 40 units subcu nightly  Increase NovoLog to 10 units before breakfast and 14 units before lunch and supper  Please continue to work on your diet and activity  Check A1c today or next few days  Please continue to check blood sugars fasting and 2-hour post meals and send all the data for review every week.

## 2023-09-26 ENCOUNTER — TELEPHONE (OUTPATIENT)
Dept: ENDOCRINOLOGY | Facility: CLINIC | Age: 33
End: 2023-09-26
Payer: MEDICAID

## 2023-09-26 RX ORDER — INSULIN ASPART 100 [IU]/ML
INJECTION, SOLUTION INTRAVENOUS; SUBCUTANEOUS
Qty: 15 ML | Refills: 3
Start: 2023-09-26

## 2023-09-26 NOTE — TELEPHONE ENCOUNTER
Pt saw Dr Aguayo on 9/21/23, he increased Levemir from 32 to 40 units and increased Novolog from 8 to 14 units before lunch and increased from 12 to 14 units before supper. Novolog before breakfast was left at 10 units per chart. Per pt actually taking 12 units before breakfast. FBS continues above goal since increase, Need more breakfast PP data (one elevated), lunch PP above goal 50% of the time since increase and supper above goal 100% of the time. Pt to increase Levemir from 40 to 45 units at HS and increase Novalog from 14 to 17 units before supper. Pt to send weekly BG log for review, states that she has been sending but have not been receiving. Pt sent log today from work email and we received. Pt will continue sending from work email on Monday mornings for RD to review weekly.

## 2023-10-03 ENCOUNTER — TELEPHONE (OUTPATIENT)
Dept: ENDOCRINOLOGY | Facility: CLINIC | Age: 33
End: 2023-10-03
Payer: MEDICAID

## 2023-10-10 ENCOUNTER — TELEPHONE (OUTPATIENT)
Dept: ENDOCRINOLOGY | Facility: CLINIC | Age: 33
End: 2023-10-10
Payer: MEDICAID

## 2023-10-10 NOTE — TELEPHONE ENCOUNTER
FBG 7/7 elevated   2hrPP Breakfast: 6/7 elevated   2hrPP Lunch: 5/7 elevated   2hrPP Supper: 4/5 elevated       Pt currently taking 50u Levemir and 12/14/17 Novolog.     Spoke to pt over the phone. Pt to increase to 55u Levemir and 16/18/22 Novolog per MD standing orders.       Bhavana Akers RD, LD, [facility]   Nutrition and Diabetes Education     Diabetes Center   [facility]/[place]  [address] [place] [address]       Epidermal Closure Graft Donor Site (Optional): simple interrupted

## 2023-10-17 ENCOUNTER — TELEPHONE (OUTPATIENT)
Dept: ENDOCRINOLOGY | Facility: CLINIC | Age: 33
End: 2023-10-17
Payer: MEDICAID

## 2023-10-17 RX ORDER — INSULIN ASPART 100 [IU]/ML
INJECTION, SOLUTION INTRAVENOUS; SUBCUTANEOUS
Qty: 15 ML | Refills: 9 | Status: SHIPPED | OUTPATIENT
Start: 2023-10-17

## 2023-10-17 NOTE — TELEPHONE ENCOUNTER
Reviewed BG log from 9/26-10/2/23. Pt continues to have elevated FBG. Pt to increase Levemir to 50 units.

## 2023-10-17 NOTE — TELEPHONE ENCOUNTER
Pts Bgs grossly elevated. Pt to increase Levemir to 62u and Novolog to 18/20/25 per MD standing order.     Reviewed Rule of 15 with pt and S&S of low BG. Pt needing new script sent in for Novolog. Will update dosage and send new script per MD standing order.       Bhavana Akers RD, LD, [facility][note 3]   Nutrition and Diabetes Education     Diabetes Center   [facility] Endocrin[facility]/[place]  [address] [place] [address]

## 2023-10-24 ENCOUNTER — LAB (OUTPATIENT)
Dept: LAB | Facility: HOSPITAL | Age: 33
End: 2023-10-24
Payer: MEDICAID

## 2023-10-24 ENCOUNTER — OFFICE VISIT (OUTPATIENT)
Dept: ENDOCRINOLOGY | Facility: CLINIC | Age: 33
End: 2023-10-24
Payer: MEDICAID

## 2023-10-24 VITALS
BODY MASS INDEX: 37.04 KG/M2 | OXYGEN SATURATION: 95 % | HEART RATE: 91 BPM | HEIGHT: 67 IN | WEIGHT: 236 LBS | DIASTOLIC BLOOD PRESSURE: 75 MMHG | SYSTOLIC BLOOD PRESSURE: 140 MMHG

## 2023-10-24 DIAGNOSIS — O24.414 INSULIN CONTROLLED GESTATIONAL DIABETES MELLITUS (GDM) IN THIRD TRIMESTER: ICD-10-CM

## 2023-10-24 DIAGNOSIS — O24.414 INSULIN CONTROLLED GESTATIONAL DIABETES MELLITUS (GDM) IN THIRD TRIMESTER: Primary | ICD-10-CM

## 2023-10-24 DIAGNOSIS — I10 ESSENTIAL HYPERTENSION: ICD-10-CM

## 2023-10-24 LAB
GLUCOSE BLDC GLUCOMTR-MCNC: 98 MG/DL (ref 70–105)
HBA1C MFR BLD: 6.2 % (ref 4.8–5.6)

## 2023-10-24 PROCEDURE — 82948 REAGENT STRIP/BLOOD GLUCOSE: CPT | Performed by: INTERNAL MEDICINE

## 2023-10-24 PROCEDURE — 1160F RVW MEDS BY RX/DR IN RCRD: CPT | Performed by: INTERNAL MEDICINE

## 2023-10-24 PROCEDURE — 83036 HEMOGLOBIN GLYCOSYLATED A1C: CPT

## 2023-10-24 PROCEDURE — 3078F DIAST BP <80 MM HG: CPT | Performed by: INTERNAL MEDICINE

## 2023-10-24 PROCEDURE — 99214 OFFICE O/P EST MOD 30 MIN: CPT | Performed by: INTERNAL MEDICINE

## 2023-10-24 PROCEDURE — 1159F MED LIST DOCD IN RCRD: CPT | Performed by: INTERNAL MEDICINE

## 2023-10-24 PROCEDURE — 3077F SYST BP >= 140 MM HG: CPT | Performed by: INTERNAL MEDICINE

## 2023-10-24 PROCEDURE — 36415 COLL VENOUS BLD VENIPUNCTURE: CPT

## 2023-10-24 RX ORDER — INSULIN ASPART 100 [IU]/ML
INJECTION, SOLUTION INTRAVENOUS; SUBCUTANEOUS
Qty: 15 ML | Refills: 9 | Status: SHIPPED | OUTPATIENT
Start: 2023-10-24

## 2023-10-24 NOTE — PROGRESS NOTES
Endocrine Progress Note Outpatient     Patient Care Team:  Penelope Gomez APRN as PCP - General (Family Medicine)  Erin Reed, RN as Nurse Navigator (Obstetrics)    Chief Complaint: Follow-up gestational diabetes    HPI: This is a 32-year-old female who is currently pregnant with her third pregnancy at 26 weeks pregnant.  She has done gestational class.  She is following her diet.  She is checking her blood sugars 4 times a day.  She is currently on Levemir 62 units in the evening and NovoLog 18+20+25 ac meals.  She is trying to follow the diet the best she can.  She did bring in blood sugar records for review.  Her morning sugars are still running high.  She also have some high blood sugars postmeal as well.      Hypertension: She is currently on labetalol and follows with her OB.    Past Medical History:   Diagnosis Date    Allergies     Gestational diabetes     Pre-diabetes        Social History     Socioeconomic History    Marital status: Single    Number of children: 2    Years of education: 12   Tobacco Use    Smoking status: Former    Smokeless tobacco: Never   Vaping Use    Vaping Use: Never used   Substance and Sexual Activity    Alcohol use: Not Currently    Drug use: Never    Sexual activity: Defer       Family History   Problem Relation Age of Onset    Hypertension Mother     Hypertension Maternal Aunt     Hypertension Maternal Uncle     Hypertension Maternal Grandmother     Diabetes Maternal Grandmother        No Known Allergies    ROS:   Constitutional:  Denies fatigue, tiredness.    Eyes:  Denies change in visual acuity   HENT:  Denies nasal congestion or sore throat   Respiratory: denies cough, shortness of breath.   Cardiovascular:  denies chest pain, edema   GI:  Denies abdominal pain, nausea, vomiting.   Musculoskeletal:  Denies back pain or joint pain   Integument:  Denies dry skin and rash   Neurologic:  Denies headache, focal weakness or sensory changes   Endocrine:  Denies  polyuria or polydipsia   Psychiatric:  Denies depression or anxiety      Vitals:    10/24/23 1523   BP: 140/75   Pulse: 91   SpO2: 95%     Body mass index is 36.96 kg/m².     Physical Exam:  GEN: NAD, conversant  EYES: EOMI,  NECK: no thyromegaly,  CV: RRR,  LUNG: CTA  PSYCH: AOX3, appropriate mood, affect normal      Results Review:     I reviewed the patient's new clinical results.    Lab Results   Component Value Date    HGBA1C 6.40 (H) 09/21/2023    HGBA1C 6.10 (H) 07/27/2023    HGBA1C 6.20 (H) 05/30/2023      Lab Results   Component Value Date    GLUCOSE 111 (H) 01/28/2021    BUN 8 08/02/2021    CREATININE 0.9 08/02/2021    EGFRIFAFRI 99 01/28/2021    BCR 9.0 (L) 08/02/2021    K 4.2 08/02/2021    CO2 25 08/02/2021    CALCIUM 9.8 08/02/2021    ALBUMIN 4.7 08/02/2021    LABIL2 1.2 08/02/2021    AST 24 08/02/2021    ALT 17 08/02/2021    TRIG 87 07/13/2020     (H) 07/13/2020    HDL 62 07/13/2020     Lab Results   Component Value Date    TSH 0.805 01/28/2021         Medication Review: Reviewed.       Current Outpatient Medications:     acetone, urine, test strip, Check urine ketones daily in the morning., Disp: 100 strip, Rfl: 4    aspirin 81 MG oral suspension, , Disp: , Rfl:     Blood Glucose Monitoring Suppl (Accu-Chek Guide) w/Device kit, 1 Device Daily., Disp: 1 kit, Rfl: 0    cetirizine (zyrTEC) 10 MG tablet, TAKE 1 TABLET BY MOUTH EVERY DAY AS NEEDED FOR ALLERGIES OR RHINITIS, Disp: , Rfl:     ferrous sulfate 325 (65 FE) MG EC tablet, TAKE 1 TABLET BY MOUTH TWICE DAILY ON EMPTY STOMACHWITH SOMETHING ACIDIC, Disp: , Rfl:     glucose blood test strip, check blood sugars 4 times a day, Disp: 100 each, Rfl: 12    insulin aspart (NovoLOG FlexPen) 100 UNIT/ML solution pen-injector sc pen, Inject 18 units before breakfast and 20 units before lunch and 25 units before supper., Disp: 15 mL, Rfl: 9    insulin detemir (LEVEMIR) 100 UNIT/ML injection, Inject 62 Units under the skin into the appropriate area as  directed Every Night., Disp: , Rfl:     Insulin Pen Needle (Pen Needles) 32G X 4 MM misc, 1 each 2 (Two) Times a Day., Disp: 90 each, Rfl: 7    labetalol (NORMODYNE) 100 MG tablet, , Disp: , Rfl:     prenatal vitamin (prenatal, CLASSIC, vitamin) tablet, Take  by mouth Daily., Disp: , Rfl:           Assessment and plan:  Gestational diabetes mellitus: Uncontrolled with significant hyperglycemia, I will increase Levemir to 70 units subcu nightly and NovoLog will be 20 units before breakfast, 25 before lunch and 30 before supper.  She is advised to follow diet and continue to check blood sugar fasting and 2-hour post meals along with morning urine ketones and send the data on a weekly basis for review.  We will check A1c today.      Hypertension: Currently on labetalol and follows with her OB.    Assessment and plan from September 21, 2023 reviewed and updated.       Ajith Aguayo MD FACE.

## 2023-10-24 NOTE — PATIENT INSTRUCTIONS
Please increase Levemir to 70 units at bedtime  Increase NovoLog to 20 units before breakfast, 25 units before lunch and 30 units before supper  Continue to monitor your sugar fasting and 2-hour post meals elevated as well as morning urine ketones and send all this data on a weekly basis for review  Continue to work on diet and activity  A1c today.

## 2023-10-24 NOTE — LETTER
October 24, 2023     Zohaib Santana MD  [address] [facility] [place] [address]    Patient: Alda Waite   YOB: 1990   Date of Visit: 10/24/2023     Dear Zohaib Santana MD:       Thank you for referring Alda Waite to me for evaluation. Below are the relevant portions of my assessment and plan of care.    If you have questions, please do not hesitate to call me. I look forward to following Alda along with you.         Sincerely,        Ajith Aguayo MD        CC: No Recipients    Ajith Aguayo MD  10/24/23 1539  Sign when Signing Visit  Endocrine Progress Note Outpatient     Patient Care Team:  Penelope Gomez APRN as PCP - General (Family Medicine)  Erin Reed RN as Nurse Navigator (Obstetrics)    Chief Complaint: Follow-up gestational diabetes    HPI: This is a 32-year-old female who is currently pregnant with her third pregnancy at 26 weeks pregnant.  She has done gestational class.  She is following her diet.  She is checking her blood sugars 4 times a day.  She is currently on Levemir 62 units in the evening and NovoLog 18+20+25 ac meals.  She is trying to follow the diet the best she can.  She did bring in blood sugar records for review.  Her morning sugars are still running high.  She also have some high blood sugars postmeal as well.      Hypertension: She is currently on labetalol and follows with her OB.    Past Medical History:   Diagnosis Date   • Allergies    • Gestational diabetes    • Pre-diabetes        Social History     Socioeconomic History   • Marital status: Single   • Number of children: 2   • Years of education: 12   Tobacco Use   • Smoking status: Former   • Smokeless tobacco: Never   Vaping Use   • Vaping Use: Never used   Substance and Sexual Activity   • Alcohol use: Not Currently   • Drug use: Never   • Sexual activity: Defer       Family History   Problem Relation Age of Onset   • Hypertension Mother    • Hypertension Maternal  Aunt    • Hypertension Maternal Uncle    • Hypertension Maternal Grandmother    • Diabetes Maternal Grandmother        No Known Allergies    ROS:   Constitutional:  Denies fatigue, tiredness.    Eyes:  Denies change in visual acuity   HENT:  Denies nasal congestion or sore throat   Respiratory: denies cough, shortness of breath.   Cardiovascular:  denies chest pain, edema   GI:  Denies abdominal pain, nausea, vomiting.   Musculoskeletal:  Denies back pain or joint pain   Integument:  Denies dry skin and rash   Neurologic:  Denies headache, focal weakness or sensory changes   Endocrine:  Denies polyuria or polydipsia   Psychiatric:  Denies depression or anxiety      Vitals:    10/24/23 1523   BP: 140/75   Pulse: 91   SpO2: 95%     Body mass index is 36.96 kg/m².     Physical Exam:  GEN: NAD, conversant  EYES: EOMI,  NECK: no thyromegaly,  CV: RRR,  LUNG: CTA  PSYCH: AOX3, appropriate mood, affect normal      Results Review:     I reviewed the patient's new clinical results.    Lab Results   Component Value Date    HGBA1C 6.40 (H) 09/21/2023    HGBA1C 6.10 (H) 07/27/2023    HGBA1C 6.20 (H) 05/30/2023      Lab Results   Component Value Date    GLUCOSE 111 (H) 01/28/2021    BUN 8 08/02/2021    CREATININE 0.9 08/02/2021    EGFRIFAFRI 99 01/28/2021    BCR 9.0 (L) 08/02/2021    K 4.2 08/02/2021    CO2 25 08/02/2021    CALCIUM 9.8 08/02/2021    ALBUMIN 4.7 08/02/2021    LABIL2 1.2 08/02/2021    AST 24 08/02/2021    ALT 17 08/02/2021    TRIG 87 07/13/2020     (H) 07/13/2020    HDL 62 07/13/2020     Lab Results   Component Value Date    TSH 0.805 01/28/2021         Medication Review: Reviewed.       Current Outpatient Medications:   •  acetone, urine, test strip, Check urine ketones daily in the morning., Disp: 100 strip, Rfl: 4  •  aspirin 81 MG oral suspension, , Disp: , Rfl:   •  Blood Glucose Monitoring Suppl (Accu-Chek Guide) w/Device kit, 1 Device Daily., Disp: 1 kit, Rfl: 0  •  cetirizine (zyrTEC) 10 MG tablet,  TAKE 1 TABLET BY MOUTH EVERY DAY AS NEEDED FOR ALLERGIES OR RHINITIS, Disp: , Rfl:   •  ferrous sulfate 325 (65 FE) MG EC tablet, TAKE 1 TABLET BY MOUTH TWICE DAILY ON EMPTY STOMACHWITH SOMETHING ACIDIC, Disp: , Rfl:   •  glucose blood test strip, check blood sugars 4 times a day, Disp: 100 each, Rfl: 12  •  insulin aspart (NovoLOG FlexPen) 100 UNIT/ML solution pen-injector sc pen, Inject 18 units before breakfast and 20 units before lunch and 25 units before supper., Disp: 15 mL, Rfl: 9  •  insulin detemir (LEVEMIR) 100 UNIT/ML injection, Inject 62 Units under the skin into the appropriate area as directed Every Night., Disp: , Rfl:   •  Insulin Pen Needle (Pen Needles) 32G X 4 MM misc, 1 each 2 (Two) Times a Day., Disp: 90 each, Rfl: 7  •  labetalol (NORMODYNE) 100 MG tablet, , Disp: , Rfl:   •  prenatal vitamin (prenatal, CLASSIC, vitamin) tablet, Take  by mouth Daily., Disp: , Rfl:           Assessment and plan:  Gestational diabetes mellitus: Uncontrolled with significant hyperglycemia, I will increase Levemir to 70 units subcu nightly and NovoLog will be 20 units before breakfast, 25 before lunch and 30 before supper.  She is advised to follow diet and continue to check blood sugar fasting and 2-hour post meals along with morning urine ketones and send the data on a weekly basis for review.  We will check A1c today.      Hypertension: Currently on labetalol and follows with her OB.    Assessment and plan from September 21, 2023 reviewed and updated.       Ajith Aguayo MD FACE.

## 2023-10-26 ENCOUNTER — TELEPHONE (OUTPATIENT)
Dept: ENDOCRINOLOGY | Facility: CLINIC | Age: 33
End: 2023-10-26
Payer: MEDICAID

## 2023-10-26 NOTE — TELEPHONE ENCOUNTER
Spoke to pt regarding need for Levemir refill. Let pt know that MD sent it in today. Pt hadnt yet received a call stating it was ready for pickup.       Called pharmacy for pt to see what was going on. Pharmacy states they will start the refill.       Called pt back to let her know.       Bhavana Akers RD, LD, [facility]   Nutrition and Diabetes Education     Diabetes Center   [facility] Endocrinology/[place]  [address] [place] [address]

## 2023-11-01 ENCOUNTER — TELEPHONE (OUTPATIENT)
Dept: ENDOCRINOLOGY | Facility: CLINIC | Age: 33
End: 2023-11-01
Payer: MEDICAID

## 2023-11-01 RX ORDER — INSULIN ASPART 100 [IU]/ML
INJECTION, SOLUTION INTRAVENOUS; SUBCUTANEOUS
Qty: 15 ML | Refills: 9 | Status: SHIPPED | OUTPATIENT
Start: 2023-11-01

## 2023-11-01 NOTE — TELEPHONE ENCOUNTER
Reviewed BG log from 10/24-10/30/23. Pt at 32 weeks gestation. Per pt Levemir was increased to 70 units @ HS on 10/24/23. Pt taking 20 units Novolog with Breakfast, 25 units with lunch and 30 units with supper. FBG continues above goal 7/7 days, 6/7 Breakfast PP above goal, 4/7 lunch PP above goal, and 5/6 supper PP above goal. Pt to increase Levemir to 80 units and increase meal time Novolog by 5 units each meal (25 units before breakfast, 30 units before lunch, and 35 units before supper).   Pt concerned about lump at injection site on thighs. Advised pt to let site heal, use site with more fat (like outside of thigh) and push insulin in slower. Pt already splitting dose (35 in each thigh). Pt also states that she is taking meal time insulin ~5 min before meal, suggested pt try taking 15 min before meals. Pt to continue sending weekly BG log for review.

## 2023-11-07 ENCOUNTER — TELEPHONE (OUTPATIENT)
Dept: ENDOCRINOLOGY | Facility: CLINIC | Age: 33
End: 2023-11-07
Payer: MEDICAID

## 2023-11-07 NOTE — TELEPHONE ENCOUNTER
Intent for pt to increase Levemir to 45u BID and Lispro to 32/35/40 TID with meals per MD standing order.     Called and spoke to pt. Pt acknowledged understanding. Pt states she knows how to treat low Bg.     Encouraged pt to send in Bgs or call clinic if they remain elevated for another insulin adjustment. Emphasized importance of BG control last few weeks of pregnancy to help lower risk of complication to mother and baby.       Bhavana Akers RD, LD, [facility]   Nutrition and Diabetes Education     Diabetes Center   [facility] Endocrinology/[place]  [address] [place] [address]

## 2023-11-08 ENCOUNTER — TELEPHONE (OUTPATIENT)
Dept: ENDOCRINOLOGY | Facility: CLINIC | Age: 33
End: 2023-11-08
Payer: MEDICAID

## 2023-11-08 NOTE — TELEPHONE ENCOUNTER
Device is working normally. 6 mode switching episodes (0.2% of total time); longest episode lasting 8+ hours on 1/24/2018 (other episode ~1 minute each). She is on ASA only.  Normal sensing of RV lead; unable to measure P waves. Stable capture of RA and RV leads; stable impedances. Battery longevity is 6 years.  No changes are made today.    FU in 6 months for next PM check. She does see Dr. Mendez today too.    Collaborating MD: Andrea   Per pt on last Levemir pen. Needing updated rx sent to pharmacy. Pt taking 45 units Levemir BID. RD to send rx for pt.

## 2023-11-14 ENCOUNTER — TELEPHONE (OUTPATIENT)
Dept: ENDOCRINOLOGY | Facility: CLINIC | Age: 33
End: 2023-11-14
Payer: MEDICAID

## 2023-11-14 NOTE — TELEPHONE ENCOUNTER
Per MD standing order, pt to increase insulin as follows:     Levemir 55u two times per day     Novolog 40u/40u/50u breakfast/lunch/dinner with meals       Wasn't able to connect with pt via phone. LVM letting pt know what to increase insulin to. Guided her to look at her my chart for note for reference or call clinic back.       Bhavana Akers RD, LD, [facility]   Nutrition and Diabetes Education     Diabetes Center   [facility] Endocrinology/[place]  [address] [place] [address]

## 2023-11-20 ENCOUNTER — TELEPHONE (OUTPATIENT)
Dept: ENDOCRINOLOGY | Facility: CLINIC | Age: 33
End: 2023-11-20
Payer: MEDICAID

## 2023-11-20 RX ORDER — INSULIN ASPART 100 [IU]/ML
INJECTION, SOLUTION INTRAVENOUS; SUBCUTANEOUS
Qty: 15 ML | Refills: 9 | Status: SHIPPED | OUTPATIENT
Start: 2023-11-20 | End: 2023-11-21 | Stop reason: SDUPTHER

## 2023-11-20 NOTE — TELEPHONE ENCOUNTER
Reviewed BG log from 11/13-11/19/23. Negative for ketones. FBG continues above goal 7/7 days but improvement noted. Breakfast PP above goal 6/7 days, lunch PP above goal 6/7 days, and supper PP 3/5 days. Pt taking 55 units of Levemir BID, 40 units of Novolog with breakfast, 40 units with lunch,and 50 units with supper. Increase Levemir to 65 units BID and Novolog to 49 with breakfast and lunch and 59 with supper per MD s/o (20% increase). Reviewed changes with pt. Pt needing refill of Novolog. Will send to pharmacy.

## 2023-11-21 ENCOUNTER — TELEPHONE (OUTPATIENT)
Dept: ENDOCRINOLOGY | Facility: CLINIC | Age: 33
End: 2023-11-21
Payer: MEDICAID

## 2023-11-21 RX ORDER — INSULIN ASPART 100 [IU]/ML
INJECTION, SOLUTION INTRAVENOUS; SUBCUTANEOUS
Qty: 15 ML | Refills: 9 | Status: SHIPPED | OUTPATIENT
Start: 2023-11-21

## 2023-11-21 NOTE — TELEPHONE ENCOUNTER
Pt states that she is unable to get her Novolog filled at [facility] because it's on back order. RD to send rx to [facility] on [address] per pt request.

## 2023-11-28 ENCOUNTER — LAB (OUTPATIENT)
Dept: LAB | Facility: HOSPITAL | Age: 33
End: 2023-11-28
Payer: MEDICAID

## 2023-11-28 ENCOUNTER — OFFICE VISIT (OUTPATIENT)
Dept: ENDOCRINOLOGY | Facility: CLINIC | Age: 33
End: 2023-11-28
Payer: MEDICAID

## 2023-11-28 VITALS
OXYGEN SATURATION: 99 % | HEART RATE: 88 BPM | SYSTOLIC BLOOD PRESSURE: 132 MMHG | WEIGHT: 244 LBS | DIASTOLIC BLOOD PRESSURE: 70 MMHG | HEIGHT: 67 IN | BODY MASS INDEX: 38.3 KG/M2

## 2023-11-28 DIAGNOSIS — I10 ESSENTIAL HYPERTENSION: ICD-10-CM

## 2023-11-28 DIAGNOSIS — O24.414 INSULIN CONTROLLED GESTATIONAL DIABETES MELLITUS (GDM) IN THIRD TRIMESTER: ICD-10-CM

## 2023-11-28 DIAGNOSIS — O24.414 INSULIN CONTROLLED GESTATIONAL DIABETES MELLITUS (GDM) IN THIRD TRIMESTER: Primary | ICD-10-CM

## 2023-11-28 LAB
GLUCOSE BLDC GLUCOMTR-MCNC: 118 MG/DL (ref 70–105)
HBA1C MFR BLD: 6.2 % (ref 4.8–5.6)

## 2023-11-28 PROCEDURE — 99214 OFFICE O/P EST MOD 30 MIN: CPT | Performed by: INTERNAL MEDICINE

## 2023-11-28 PROCEDURE — 83036 HEMOGLOBIN GLYCOSYLATED A1C: CPT

## 2023-11-28 PROCEDURE — 36415 COLL VENOUS BLD VENIPUNCTURE: CPT

## 2023-11-28 PROCEDURE — 1160F RVW MEDS BY RX/DR IN RCRD: CPT | Performed by: INTERNAL MEDICINE

## 2023-11-28 PROCEDURE — 3075F SYST BP GE 130 - 139MM HG: CPT | Performed by: INTERNAL MEDICINE

## 2023-11-28 PROCEDURE — 82948 REAGENT STRIP/BLOOD GLUCOSE: CPT | Performed by: INTERNAL MEDICINE

## 2023-11-28 PROCEDURE — 1159F MED LIST DOCD IN RCRD: CPT | Performed by: INTERNAL MEDICINE

## 2023-11-28 PROCEDURE — 3078F DIAST BP <80 MM HG: CPT | Performed by: INTERNAL MEDICINE

## 2023-11-28 RX ORDER — INSULIN ASPART 100 [IU]/ML
INJECTION, SOLUTION INTRAVENOUS; SUBCUTANEOUS
Qty: 45 ML | Refills: 6 | Status: SHIPPED | OUTPATIENT
Start: 2023-11-28

## 2023-11-28 NOTE — PATIENT INSTRUCTIONS
Please change Levemir to 70 units subcu twice a day  Please increase your NovoLog to 55 units before breakfast and lunch and 65 units before dinner  Please continue to work on your diet and activity, decrease the starch based carbohydrates and add more fruits and veggies to your diet and avoid soda completely with a regular or diet.  Please ask your OB to see if metformin would be okay to use at this stage in pregnancy  Always keep glucose source in case of low blood sugar  A1c today.

## 2023-11-28 NOTE — PROGRESS NOTES
Endocrine Progress Note Outpatient     Patient Care Team:  Penelope Gomez APRN as PCP - General (Family Medicine)  Erin Reed, RN as Nurse Navigator (Obstetrics)    Chief Complaint: Follow-up gestational diabetes    HPI: This is a 33-year-old female who is currently pregnant with her third pregnancy at 36 weeks pregnant.  She has done gestational class.  She is following her diet.  She is checking her blood sugars 4 times a day.  She is currently on Levemir 65 units subcu twice a day and she is taking NovoLog 49 units before breakfast and lunch and 59 units before supper.  She did bring in blood sugar records for review.  She continues to have morning hyperglycemia as well as postprandial hyperglycemia.  Her urine ketones are negative.      Hypertension: She is currently on labetalol and follows with her OB.    Past Medical History:   Diagnosis Date    Allergies     Gestational diabetes     Pre-diabetes        Social History     Socioeconomic History    Marital status: Single    Number of children: 2    Years of education: 12   Tobacco Use    Smoking status: Former    Smokeless tobacco: Never   Vaping Use    Vaping Use: Never used   Substance and Sexual Activity    Alcohol use: Not Currently    Drug use: Never    Sexual activity: Defer       Family History   Problem Relation Age of Onset    Hypertension Mother     Hypertension Maternal Aunt     Hypertension Maternal Uncle     Hypertension Maternal Grandmother     Diabetes Maternal Grandmother        No Known Allergies    ROS:   Constitutional:  Denies fatigue, tiredness.    Eyes:  Denies change in visual acuity   HENT:  Denies nasal congestion or sore throat   Respiratory: denies cough, shortness of breath.   Cardiovascular:  denies chest pain, edema   GI:  Denies abdominal pain, nausea, vomiting.   Musculoskeletal:  Denies back pain or joint pain   Integument:  Denies dry skin and rash   Neurologic:  Denies headache, focal weakness or sensory changes    Endocrine:  Denies polyuria or polydipsia   Psychiatric:  Denies depression or anxiety      Vitals:    11/28/23 1528   BP: 132/70   Pulse: 88   SpO2: 99%       Body mass index is 38.22 kg/m².     Physical Exam:  GEN: NAD, conversant  EYES: EOMI,  NECK: no thyromegaly,  CV: RRR,  LUNG: CTA  PSYCH: AOX3, appropriate mood, affect normal      Results Review:     I reviewed the patient's new clinical results.    Lab Results   Component Value Date    HGBA1C 6.20 (H) 10/24/2023    HGBA1C 6.40 (H) 09/21/2023    HGBA1C 6.10 (H) 07/27/2023      Lab Results   Component Value Date    GLUCOSE 111 (H) 01/28/2021    BUN 9 01/10/2023    CREATININE 0.83 01/10/2023    EGFRIFAFRI >60 01/10/2023    BCR 10.7 01/10/2023    K 4.6 01/10/2023    CO2 27 01/10/2023    CALCIUM 9.7 01/10/2023    ALBUMIN 4.4 08/02/2022    LABIL2 1.3 08/02/2022    AST 35 08/02/2022    ALT 26 08/02/2022    TRIG 87 07/13/2020     (H) 07/13/2020    HDL 62 07/13/2020     Lab Results   Component Value Date    TSH 0.805 01/28/2021         Medication Review: Reviewed.       Current Outpatient Medications:     acetone, urine, test strip, Check urine ketones daily in the morning., Disp: 100 strip, Rfl: 4    aspirin 81 MG oral suspension, , Disp: , Rfl:     Blood Glucose Monitoring Suppl (Accu-Chek Guide) w/Device kit, 1 Device Daily., Disp: 1 kit, Rfl: 0    cetirizine (zyrTEC) 10 MG tablet, TAKE 1 TABLET BY MOUTH EVERY DAY AS NEEDED FOR ALLERGIES OR RHINITIS, Disp: , Rfl:     ferrous sulfate 325 (65 FE) MG EC tablet, TAKE 1 TABLET BY MOUTH TWICE DAILY ON EMPTY STOMACHWITH SOMETHING ACIDIC, Disp: , Rfl:     glucose blood test strip, check blood sugars 4 times a day, Disp: 100 each, Rfl: 12    insulin aspart (NovoLOG FlexPen) 100 UNIT/ML solution pen-injector sc pen, Inject 49 units before breakfast and 49 units before lunch and 59 units before supper., Disp: 15 mL, Rfl: 9    insulin detemir (LEVEMIR) 100 UNIT/ML injection, Inject 65 Units under the skin into the  appropriate area as directed 2 (Two) Times a Day., Disp: 15 mL, Rfl: 6    Insulin Pen Needle (Pen Needles) 32G X 4 MM misc, 1 each 2 (Two) Times a Day., Disp: 90 each, Rfl: 7    labetalol (NORMODYNE) 100 MG tablet, , Disp: , Rfl:     prenatal vitamin (prenatal, CLASSIC, vitamin) tablet, Take  by mouth Daily., Disp: , Rfl:       Assessment and plan:  Gestational diabetes mellitus: Uncontrolled with significant hyperglycemia.  At this time I will change Levemir to 70 units twice a day and increase NovoLog to 55 units before breakfast and lunch and 65 units before dinner.  She is advised to continue to watch her diet, reducing starch based carbohydrates and add more fruits and veggies to her diet.  Watch for low blood sugars.  She has her OB appointment tomorrow, I have asked her to ask her OB to see if metformin will be okay to be used at this point in pregnancy.    I have advised her that once she goes into the labor she will probably does not need insulin at that point and continue to monitor blood sugars.  We will make a decision after delivery whether she needs any treatment for type 2 diabetes or not.  She was not taking any medications for diabetes before pregnancy.    Hypertension: Currently on labetalol and follows with her OB.    Assessment and plan from October 24, 2023 reviewed and updated.      Ajith Aguayo MD FACE.

## 2023-12-06 RX ORDER — INSULIN ASPART 100 [IU]/ML
INJECTION, SOLUTION INTRAVENOUS; SUBCUTANEOUS
Qty: 45 ML | Refills: 6 | Status: SHIPPED | OUTPATIENT
Start: 2023-12-06

## 2023-12-07 ENCOUNTER — TELEPHONE (OUTPATIENT)
Dept: ENDOCRINOLOGY | Facility: CLINIC | Age: 33
End: 2023-12-07
Payer: MEDICAID

## 2023-12-07 NOTE — TELEPHONE ENCOUNTER
Reviewed BG log.     Spoke to pt. Pt to leave Levemir at 70u BID and increase Novolog to 60/60/70 per MD standing orders.     Pt states she is having issues getting Novolog filled rt to supply issues at her pharmacy. Pt not out of insulin at this time. Pt to call around to pharmacies to see if they have any in stock. If she can't find any, will complete PA for Humalog.               Bhavana Akers RD, LD, [facility]   Nutrition and Diabetes Education     Diabetes Center   [facility] Endocrinology/[place]  [address] [place] [address]

## 2023-12-11 ENCOUNTER — PREP FOR SURGERY (OUTPATIENT)
Dept: OTHER | Facility: HOSPITAL | Age: 33
End: 2023-12-11
Payer: MEDICAID

## 2023-12-11 RX ORDER — ACETAMINOPHEN 325 MG/1
650 TABLET ORAL EVERY 4 HOURS PRN
Status: CANCELLED | OUTPATIENT
Start: 2023-12-11

## 2023-12-11 RX ORDER — SODIUM CHLORIDE 9 MG/ML
40 INJECTION, SOLUTION INTRAVENOUS AS NEEDED
Status: CANCELLED | OUTPATIENT
Start: 2023-12-11

## 2023-12-11 RX ORDER — LIDOCAINE HYDROCHLORIDE 10 MG/ML
0.5 INJECTION, SOLUTION EPIDURAL; INFILTRATION; INTRACAUDAL; PERINEURAL ONCE AS NEEDED
Status: CANCELLED | OUTPATIENT
Start: 2023-12-11

## 2023-12-11 RX ORDER — MISOPROSTOL 200 UG/1
800 TABLET ORAL AS NEEDED
Status: CANCELLED | OUTPATIENT
Start: 2023-12-11

## 2023-12-11 RX ORDER — SODIUM CHLORIDE 0.9 % (FLUSH) 0.9 %
10 SYRINGE (ML) INJECTION EVERY 12 HOURS SCHEDULED
Status: CANCELLED | OUTPATIENT
Start: 2023-12-11

## 2023-12-11 RX ORDER — OXYTOCIN-SODIUM CHLORIDE 0.9% IV SOLN 30 UNIT/500ML 30-0.9/5 UT/ML-%
250 SOLUTION INTRAVENOUS CONTINUOUS
Status: CANCELLED | OUTPATIENT
Start: 2023-12-11 | End: 2023-12-11

## 2023-12-11 RX ORDER — OXYTOCIN-SODIUM CHLORIDE 0.9% IV SOLN 30 UNIT/500ML 30-0.9/5 UT/ML-%
2 SOLUTION INTRAVENOUS
Status: CANCELLED | OUTPATIENT
Start: 2023-12-15

## 2023-12-11 RX ORDER — ONDANSETRON 2 MG/ML
4 INJECTION INTRAMUSCULAR; INTRAVENOUS EVERY 6 HOURS PRN
Status: CANCELLED | OUTPATIENT
Start: 2023-12-11

## 2023-12-11 RX ORDER — METHYLERGONOVINE MALEATE 0.2 MG/ML
200 INJECTION INTRAVENOUS ONCE AS NEEDED
Status: CANCELLED | OUTPATIENT
Start: 2023-12-11

## 2023-12-11 RX ORDER — ONDANSETRON 4 MG/1
4 TABLET, FILM COATED ORAL EVERY 6 HOURS PRN
Status: CANCELLED | OUTPATIENT
Start: 2023-12-11

## 2023-12-11 RX ORDER — OXYTOCIN-SODIUM CHLORIDE 0.9% IV SOLN 30 UNIT/500ML 30-0.9/5 UT/ML-%
999 SOLUTION INTRAVENOUS ONCE
Status: CANCELLED | OUTPATIENT
Start: 2023-12-11 | End: 2023-12-11

## 2023-12-11 RX ORDER — IBUPROFEN 600 MG/1
600 TABLET ORAL EVERY 6 HOURS PRN
Status: CANCELLED | OUTPATIENT
Start: 2023-12-11

## 2023-12-11 RX ORDER — CARBOPROST TROMETHAMINE 250 UG/ML
250 INJECTION, SOLUTION INTRAMUSCULAR AS NEEDED
Status: CANCELLED | OUTPATIENT
Start: 2023-12-11

## 2023-12-11 RX ORDER — SODIUM CHLORIDE 0.9 % (FLUSH) 0.9 %
10 SYRINGE (ML) INJECTION AS NEEDED
Status: CANCELLED | OUTPATIENT
Start: 2023-12-11

## 2023-12-11 RX ORDER — SODIUM CHLORIDE, SODIUM LACTATE, POTASSIUM CHLORIDE, CALCIUM CHLORIDE 600; 310; 30; 20 MG/100ML; MG/100ML; MG/100ML; MG/100ML
125 INJECTION, SOLUTION INTRAVENOUS CONTINUOUS
Status: CANCELLED | OUTPATIENT
Start: 2023-12-11

## 2023-12-14 ENCOUNTER — PATIENT OUTREACH (OUTPATIENT)
Dept: LABOR AND DELIVERY | Facility: HOSPITAL | Age: 33
End: 2023-12-14
Payer: MEDICAID

## 2023-12-14 NOTE — OUTREACH NOTE
[facility]  Unable to Reach       Questions/Answers      Flowsheet Row Responses   Pending Outreach Prenatal Check-in   Call Attempt First   Outcome Left message, MyChart message sent to patient            IOL noted 12/15/23 at 0500    Erin Scruggs RN  Maternity Nurse Navigator    12/14/2023, 17:35 EST

## 2023-12-15 ENCOUNTER — ANESTHESIA EVENT (OUTPATIENT)
Dept: LABOR AND DELIVERY | Facility: HOSPITAL | Age: 33
End: 2023-12-15
Payer: MEDICAID

## 2023-12-15 ENCOUNTER — HOSPITAL ENCOUNTER (OUTPATIENT)
Dept: LABOR AND DELIVERY | Facility: HOSPITAL | Age: 33
Discharge: HOME | End: 2023-12-15
Payer: MEDICAID

## 2023-12-15 ENCOUNTER — ANESTHESIA (OUTPATIENT)
Dept: LABOR AND DELIVERY | Facility: HOSPITAL | Age: 33
End: 2023-12-15
Payer: MEDICAID

## 2023-12-15 ENCOUNTER — HOSPITAL ENCOUNTER (INPATIENT)
Facility: HOSPITAL | Age: 33
LOS: 2 days | Discharge: HOME | End: 2023-12-17
Attending: OBSTETRICS & GYNECOLOGY | Admitting: OBSTETRICS & GYNECOLOGY
Payer: MEDICAID

## 2023-12-15 PROBLEM — O36.8131 DECREASED FETAL MOVEMENT AFFECTING MANAGEMENT OF PREGNANCY IN THIRD TRIMESTER, FETUS 1: Status: ACTIVE | Noted: 2023-12-15

## 2023-12-15 LAB
ABO GROUP BLD: NORMAL
BASOPHILS # BLD AUTO: 0 10*3/MM3 (ref 0–0.2)
BASOPHILS NFR BLD AUTO: 0.1 % (ref 0–1.5)
BLD GP AB SCN SERPL QL: NEGATIVE
DEPRECATED RDW RBC AUTO: 50.3 FL (ref 37–54)
EOSINOPHIL # BLD AUTO: 0.1 10*3/MM3 (ref 0–0.4)
EOSINOPHIL NFR BLD AUTO: 0.6 % (ref 0.3–6.2)
ERYTHROCYTE [DISTWIDTH] IN BLOOD BY AUTOMATED COUNT: 15.7 % (ref 12.3–15.4)
HCT VFR BLD AUTO: 36.2 % (ref 34–46.6)
HGB BLD-MCNC: 12.1 G/DL (ref 12–15.9)
LYMPHOCYTES # BLD AUTO: 1.6 10*3/MM3 (ref 0.7–3.1)
LYMPHOCYTES NFR BLD AUTO: 16.4 % (ref 19.6–45.3)
MCH RBC QN AUTO: 30.8 PG (ref 26.6–33)
MCHC RBC AUTO-ENTMCNC: 33.4 G/DL (ref 31.5–35.7)
MCV RBC AUTO: 92.3 FL (ref 79–97)
MONOCYTES # BLD AUTO: 1 10*3/MM3 (ref 0.1–0.9)
MONOCYTES NFR BLD AUTO: 10.1 % (ref 5–12)
NEUTROPHILS NFR BLD AUTO: 7.1 10*3/MM3 (ref 1.7–7)
NEUTROPHILS NFR BLD AUTO: 72.8 % (ref 42.7–76)
NRBC BLD AUTO-RTO: 0.1 /100 WBC (ref 0–0.2)
PLATELET # BLD AUTO: 219 10*3/MM3 (ref 140–450)
PMV BLD AUTO: 9.3 FL (ref 6–12)
RBC # BLD AUTO: 3.93 10*6/MM3 (ref 3.77–5.28)
RH BLD: POSITIVE
RPR SER QL: NORMAL
T&S EXPIRATION DATE: NORMAL
WBC NRBC COR # BLD AUTO: 9.7 10*3/MM3 (ref 3.4–10.8)

## 2023-12-15 PROCEDURE — 86900 BLOOD TYPING SEROLOGIC ABO: CPT | Performed by: OBSTETRICS & GYNECOLOGY

## 2023-12-15 PROCEDURE — 86901 BLOOD TYPING SEROLOGIC RH(D): CPT

## 2023-12-15 PROCEDURE — 86900 BLOOD TYPING SEROLOGIC ABO: CPT

## 2023-12-15 PROCEDURE — 85025 COMPLETE CBC W/AUTO DIFF WBC: CPT | Performed by: OBSTETRICS & GYNECOLOGY

## 2023-12-15 PROCEDURE — 25810000003 LACTATED RINGERS PER 1000 ML: Performed by: OBSTETRICS & GYNECOLOGY

## 2023-12-15 PROCEDURE — 86850 RBC ANTIBODY SCREEN: CPT | Performed by: OBSTETRICS & GYNECOLOGY

## 2023-12-15 PROCEDURE — C1755 CATHETER, INTRASPINAL: HCPCS | Performed by: ANESTHESIOLOGY

## 2023-12-15 PROCEDURE — 86901 BLOOD TYPING SEROLOGIC RH(D): CPT | Performed by: OBSTETRICS & GYNECOLOGY

## 2023-12-15 PROCEDURE — 86592 SYPHILIS TEST NON-TREP QUAL: CPT | Performed by: OBSTETRICS & GYNECOLOGY

## 2023-12-15 RX ORDER — EPHEDRINE SULFATE 5 MG/ML
10 INJECTION INTRAVENOUS
Status: DISCONTINUED | OUTPATIENT
Start: 2023-12-15 | End: 2023-12-15

## 2023-12-15 RX ORDER — SODIUM CHLORIDE 0.9 % (FLUSH) 0.9 %
10 SYRINGE (ML) INJECTION AS NEEDED
Status: DISCONTINUED | OUTPATIENT
Start: 2023-12-15 | End: 2023-12-15

## 2023-12-15 RX ORDER — LIDOCAINE HCL/EPINEPHRINE/PF 2%-1:200K
VIAL (ML) INJECTION AS NEEDED
Status: DISCONTINUED | OUTPATIENT
Start: 2023-12-15 | End: 2023-12-15 | Stop reason: SURG

## 2023-12-15 RX ORDER — SODIUM CHLORIDE 0.9 % (FLUSH) 0.9 %
10 SYRINGE (ML) INJECTION EVERY 12 HOURS SCHEDULED
Status: DISCONTINUED | OUTPATIENT
Start: 2023-12-15 | End: 2023-12-15

## 2023-12-15 RX ORDER — SODIUM CHLORIDE, SODIUM LACTATE, POTASSIUM CHLORIDE, CALCIUM CHLORIDE 600; 310; 30; 20 MG/100ML; MG/100ML; MG/100ML; MG/100ML
125 INJECTION, SOLUTION INTRAVENOUS CONTINUOUS
Status: DISCONTINUED | OUTPATIENT
Start: 2023-12-15 | End: 2023-12-15

## 2023-12-15 RX ORDER — LIDOCAINE HCL/EPINEPHRINE/PF 2%-1:200K
VIAL (ML) INJECTION
Status: COMPLETED
Start: 2023-12-15 | End: 2023-12-15

## 2023-12-15 RX ORDER — OXYTOCIN/0.9 % SODIUM CHLORIDE 30/500 ML
250 PLASTIC BAG, INJECTION (ML) INTRAVENOUS CONTINUOUS
Status: DISCONTINUED | OUTPATIENT
Start: 2023-12-16 | End: 2023-12-16

## 2023-12-15 RX ORDER — ACETAMINOPHEN 325 MG/1
650 TABLET ORAL EVERY 4 HOURS PRN
Status: DISCONTINUED | OUTPATIENT
Start: 2023-12-15 | End: 2023-12-16 | Stop reason: HOSPADM

## 2023-12-15 RX ORDER — BUPIVACAINE HYDROCHLORIDE 5 MG/ML
INJECTION, SOLUTION EPIDURAL; INTRACAUDAL
Status: DISPENSED
Start: 2023-12-15 | End: 2023-12-16

## 2023-12-15 RX ORDER — LIDOCAINE HYDROCHLORIDE 10 MG/ML
INJECTION, SOLUTION EPIDURAL; INFILTRATION; INTRACAUDAL; PERINEURAL
Status: DISCONTINUED
Start: 2023-12-15 | End: 2023-12-15 | Stop reason: WASHOUT

## 2023-12-15 RX ORDER — METHYLERGONOVINE MALEATE 0.2 MG/ML
200 INJECTION INTRAVENOUS ONCE AS NEEDED
Status: DISCONTINUED | OUTPATIENT
Start: 2023-12-15 | End: 2023-12-16 | Stop reason: HOSPADM

## 2023-12-15 RX ORDER — FENTANYL 0.2 MG/100ML-BUPIV 0.125%-NACL 0.9% EPIDURAL INJ 2/0.125 MCG/ML-%
SOLUTION INJECTION CONTINUOUS
Status: DISCONTINUED | OUTPATIENT
Start: 2023-12-15 | End: 2023-12-15

## 2023-12-15 RX ORDER — LIDOCAINE HYDROCHLORIDE 10 MG/ML
30 INJECTION, SOLUTION INFILTRATION; PERINEURAL ONCE
Status: DISCONTINUED | OUTPATIENT
Start: 2023-12-16 | End: 2023-12-15

## 2023-12-15 RX ORDER — FENTANYL 0.2 MG/100ML-BUPIV 0.125%-NACL 0.9% EPIDURAL INJ 2/0.125 MCG/ML-%
SOLUTION INJECTION
Status: COMPLETED
Start: 2023-12-15 | End: 2023-12-15

## 2023-12-15 RX ORDER — CARBOPROST TROMETHAMINE 250 UG/ML
250 INJECTION, SOLUTION INTRAMUSCULAR AS NEEDED
Status: DISCONTINUED | OUTPATIENT
Start: 2023-12-15 | End: 2023-12-16 | Stop reason: HOSPADM

## 2023-12-15 RX ORDER — LIDOCAINE HYDROCHLORIDE 10 MG/ML
0.5 INJECTION, SOLUTION EPIDURAL; INFILTRATION; INTRACAUDAL; PERINEURAL ONCE AS NEEDED
Status: DISCONTINUED | OUTPATIENT
Start: 2023-12-15 | End: 2023-12-15

## 2023-12-15 RX ORDER — ACETAMINOPHEN 325 MG/1
650 TABLET ORAL EVERY 4 HOURS PRN
Status: DISCONTINUED | OUTPATIENT
Start: 2023-12-15 | End: 2023-12-15

## 2023-12-15 RX ORDER — OXYTOCIN/0.9 % SODIUM CHLORIDE 30/500 ML
2 PLASTIC BAG, INJECTION (ML) INTRAVENOUS
Status: DISCONTINUED | OUTPATIENT
Start: 2023-12-15 | End: 2023-12-15

## 2023-12-15 RX ORDER — ONDANSETRON 4 MG/1
4 TABLET, FILM COATED ORAL EVERY 6 HOURS PRN
Status: DISCONTINUED | OUTPATIENT
Start: 2023-12-15 | End: 2023-12-15

## 2023-12-15 RX ORDER — SODIUM CHLORIDE 9 MG/ML
40 INJECTION, SOLUTION INTRAVENOUS AS NEEDED
Status: DISCONTINUED | OUTPATIENT
Start: 2023-12-15 | End: 2023-12-15

## 2023-12-15 RX ORDER — OXYTOCIN/0.9 % SODIUM CHLORIDE 30/500 ML
999 PLASTIC BAG, INJECTION (ML) INTRAVENOUS ONCE
Status: DISCONTINUED | OUTPATIENT
Start: 2023-12-16 | End: 2023-12-16 | Stop reason: HOSPADM

## 2023-12-15 RX ORDER — IBUPROFEN 600 MG/1
600 TABLET ORAL EVERY 6 HOURS PRN
Status: DISCONTINUED | OUTPATIENT
Start: 2023-12-15 | End: 2023-12-16 | Stop reason: HOSPADM

## 2023-12-15 RX ORDER — ONDANSETRON 2 MG/ML
4 INJECTION INTRAMUSCULAR; INTRAVENOUS EVERY 6 HOURS PRN
Status: DISCONTINUED | OUTPATIENT
Start: 2023-12-15 | End: 2023-12-15

## 2023-12-15 RX ORDER — FENTANYL 0.2 MG/100ML-BUPIV 0.125%-NACL 0.9% EPIDURAL INJ 2/0.125 MCG/ML-%
SOLUTION INJECTION CONTINUOUS PRN
Status: DISCONTINUED | OUTPATIENT
Start: 2023-12-15 | End: 2023-12-15 | Stop reason: SURG

## 2023-12-15 RX ORDER — MISOPROSTOL 200 UG/1
800 TABLET ORAL AS NEEDED
Status: DISCONTINUED | OUTPATIENT
Start: 2023-12-15 | End: 2023-12-16 | Stop reason: HOSPADM

## 2023-12-15 RX ADMIN — Medication 8 ML/HR: at 13:05

## 2023-12-15 RX ADMIN — LIDOCAINE HYDROCHLORIDE AND EPINEPHRINE 3 ML: 20; 5 INJECTION, SOLUTION EPIDURAL; INFILTRATION; INTRACAUDAL; PERINEURAL at 12:57

## 2023-12-15 RX ADMIN — SODIUM CHLORIDE, POTASSIUM CHLORIDE, SODIUM LACTATE AND CALCIUM CHLORIDE 125 ML/HR: 600; 310; 30; 20 INJECTION, SOLUTION INTRAVENOUS at 17:25

## 2023-12-15 RX ADMIN — Medication 250 ML/HR: at 23:38

## 2023-12-15 RX ADMIN — Medication 2 MILLI-UNITS/MIN: at 06:37

## 2023-12-15 RX ADMIN — SODIUM CHLORIDE, POTASSIUM CHLORIDE, SODIUM LACTATE AND CALCIUM CHLORIDE 125 ML/HR: 600; 310; 30; 20 INJECTION, SOLUTION INTRAVENOUS at 05:59

## 2023-12-15 RX ADMIN — SODIUM CHLORIDE, POTASSIUM CHLORIDE, SODIUM LACTATE AND CALCIUM CHLORIDE 125 ML/HR: 600; 310; 30; 20 INJECTION, SOLUTION INTRAVENOUS at 12:27

## 2023-12-15 NOTE — PLAN OF CARE
Goal Outcome Evaluation:  Plan of Care Reviewed With: patient        Progress: improving  Outcome Evaluation: pt resting with edpidural infusing; pt controlled with epidural. partner at bedside.

## 2023-12-15 NOTE — ANESTHESIA PROCEDURE NOTES
Epidural Block    Pre-sedation assessment completed: 12/15/2023 12:35 PM    Patient reassessed immediately prior to procedure    Patient location during procedure: OB  Start Time: 12/15/2023 12:44 PM  Stop Time: 12/15/2023 12:57 PM  Indication:at surgeon's request, post-op pain management and procedure for pain  Performed By  Anesthesiologist: Julio Colunga MD  CRNA/CAA: Eden Kemp CRNA  Preanesthetic Checklist  Completed: patient identified, IV checked, site marked, risks and benefits discussed, surgical consent, monitors and equipment checked, pre-op evaluation and timeout performed  Prep:  Pt Position:sitting  Sterile Tech:cap, gloves, mask and sterile barrier  Prep:chlorhexidine gluconate and isopropyl alcohol  Monitoring:blood pressure monitoring and continuous pulse oximetry  Epidural Block Procedure:  Approach:midline  Guidance:ultrasound guided and landmark technique  Location:lumbar  Level:3-4  Needle Type:Tuohy  Needle Gauge:17 G  Cath Size: 19 G  Loss of Resistance Medium: saline  Loss of Resistance: 8cm  Cath Depth at skin:14 cm  Paresthesia: none  Aspiration:negative  Test Dose:negative  Post Assessment:  Dressing:occlusive dressing applied and secured with tape  Pt Tolerance:patient tolerated the procedure well with no apparent complications  Complications:no

## 2023-12-15 NOTE — H&P
MEAGAN Talley  Obstetric History and Physical     Chief Complaint: Induction of labor with gestational hypertension and gestational diabetes    Subjective     Patient is a 33 y.o. female  currently at 38w5d by dates and early ultrasound, who presents with orders for induction.    Her prenatal care I complicated by gestational hypertension.  She was on lisinopril prepregnancy converted to labetalol.  She had a abnormal 3-hour glucose tolerance test but managed her diabetes with diet and insulin      Prenatal Information:  Prenatal Results       Initial Prenatal Labs       Test Value Reference Range Date Time    Hemoglobin ^ 12.1 g/dL 12.0 - 16.0 23 0914    Hematocrit ^ 36.5 % 36.0 - 46.0 23 0914    Platelets ^ 237 10*3/uL 140 - 440 23 0914    Rubella IgG ^ Immune   23     Hepatitis B SAg ^ Negative   23     Hepatitis C Ab ^ non-reactive   23     RPR        T. Pallidum Ab         ABO  O   12/15/23 0554    Rh  Positive   12/15/23 0554    Antibody Screen        HIV ^ Non-Reactive   23     Urine Culture        Gonorrhea        Chlamydia        TSH  0.805 uIU/mL 0.270 - 4.200 21 1637    HgB A1c   6.20 % 4.80 - 5.60 23 1549       6.20 % 4.80 - 5.60 10/24/23 1545       6.40 % 4.80 - 5.60 23 1527       6.10 % 4.80 - 5.60 23 0957       6.20 % 4.80 - 5.60 23 1201    Varicella IgG        HgB Electrophoresis         Cystic fibrosis                   Fetal testing        Test Value Reference Range Date Time    NIPT        MSAFP        AFP-4                  2nd and 3rd Trimester       Test Value Reference Range Date Time    Hemoglobin (repeated)  12.1 g/dL 12.0 - 15.9 12/15/23 0554    Hematocrit (repeated)  36.2 % 34.0 - 46.6 12/15/23 0554    Platelets   219 10*3/mm3 140 - 450 12/15/23 0554      ^ 237 10*3/uL 140 - 440 23 0914    GCT        Antibody Screen (repeated)  Negative   12/15/23 0554    Third Trimester syphilis screen (repeated)         GTT  Fasting        GTT 1 Hr        GTT 2 Hr        GTT 3 Hr        Group B Strep ^ Negative   11/29/23               Other testing        Test Value Reference Range Date Time    Parvo IgG         CMV IgG                   Drug Screening       Test Value Reference Range Date Time    Amphetamine Screen        Barbiturate Screen        Benzodiazepine Screen        Methadone Screen        Phencyclidine Screen        Opiates Screen        THC Screen        Cocaine Screen        Propoxyphene Screen        Buprenorphine Screen        Methamphetamine Screen        Oxycodone Screen        Tricyclic Antidepressants Screen                  Legend    ^: Historical                          External Prenatal Results       Pregnancy Outside Results - Transcribed From Office Records - See Scanned Records For Details       Test Value Date Time    ABO  O  12/15/23 0554    Rh  Positive  12/15/23 0554    Antibody Screen  Negative  12/15/23 0554    Varicella IgG       Rubella ^ Immune  05/04/23     Hgb  12.1 g/dL 12/15/23 0554      ^ 12.1 g/dL 08/03/23 0914    Hct  36.2 % 12/15/23 0554      ^ 36.5 % 08/03/23 0914    Glucose Fasting GTT       Glucose Tolerance Test 1 hour       Glucose Tolerance Test 3 hour       Gonorrhea (discrete)       Chlamydia (discrete)       RPR       VDRL       Syphilis Antibody ^ negative  05/04/23     HBsAg ^ Negative  05/04/23     Herpes Simplex Virus PCR       Herpes Simplex VIrus Culture       HIV ^ Non-Reactive  05/04/23     Hep C RNA Quant PCR       Hep C Antibody ^ non-reactive  05/04/23     AFP       Group B Strep ^ Negative  11/29/23     GBS Susceptibility to Clindamycin       GBS Susceptibility to Erythromycin       Fetal Fibronectin       Genetic Testing, Maternal Blood                 Drug Screening       Test Value Date Time    Urine Drug Screen       Amphetamine Screen       Barbiturate Screen  Negative  01/28/21 1637    Benzodiazepine Screen  Negative  01/28/21 1637    Methadone Screen  Negative   01/28/21 1637    Phencyclidine Screen       Opiates Screen  Negative  01/28/21 1637    THC Screen  Negative  01/28/21 1637    Cocaine Screen       Propoxyphene Screen       Buprenorphine Screen       Methamphetamine Screen       Oxycodone Screen  Negative  01/28/21 1637    Tricyclic Antidepressants Screen                 Legend    ^: Historical                             Past OB History:         Past Medical History: Past Medical History:   Diagnosis Date    Allergies     Anxiety     Chlamydia     2012, 2013, 2014    Gestational diabetes     Hypertension     Migraine     Pre-diabetes          Past Surgical History Past Surgical History:   Procedure Laterality Date    TONSILLECTOMY           Family History: Family History   Problem Relation Age of Onset    Hypertension Mother     Hypertension Maternal Aunt     Alcohol abuse Maternal Uncle     Hypertension Maternal Uncle     Alcohol abuse Maternal Grandmother     Hypertension Maternal Grandmother     Diabetes Maternal Grandmother     Uterine cancer Maternal Grandmother     Hypertension Maternal Grandfather       Social History:  reports that she has quit smoking. She has never used smokeless tobacco.   reports that she does not currently use alcohol.   reports no history of drug use.        General ROS: Pertinent items are noted in HPI    Objective      Vitals:     Vitals:    12/15/23 0615 12/15/23 0700 12/15/23 0730 12/15/23 0806   BP: 143/78 148/84 143/76 137/74   BP Location:       Patient Position:       Pulse: 85 87 90 80   Resp:       Temp:       TempSrc:       SpO2: 96%  96% 100%   Weight:       Height:           Fetal Heart Rate Assessment:   Internal scalp electrode placed    [place]:   Internal pressure monitor placed     Physical Exam:     General Appearance:    Alert, cooperative, in no acute distress   Lungs:     Clear to auscultation,respirations regular.    Heart:    Regular rhythm and normal rate.   Breast Exam:    Deferred   Abdomen:     Normal bowel  sounds, no masses, soft non-tender,          non-distended, no guarding, no rebound tenderness   Pelvic Exam:         Presentation: Vertex    Cervix: 2 to 3 cm / 75% effaced/-1 position with clear fluid on rupture   Extremities:   Moves all extremities well, no edema, no cyanosis, no              redness   Skin:   No bleeding, bruising or rash   Neurologic:   No focal neurologic defect          Laboratory Results:   Lab Results (last 48 hours)       Procedure Component Value Units Date/Time    SCANNED - LABS [[id number]] Resulted: 12/15/23     Updated: 12/15/23 0758    SCANNED - LABS [[id number]] Resulted: 12/15/23     Updated: 12/15/23 0757    CBC & Differential [[id number]]  (Abnormal) Collected: 12/15/23 0554    Specimen: Blood Updated: 12/15/23 0638    Narrative:      The following orders were created for panel order CBC & Differential.  Procedure                               Abnormality         Status                     ---------                               -----------         ------                     CBC Auto Differential[[id number]]        Abnormal            Final result                 Please view results for these tests on the individual orders.    CBC Auto Differential [[id number]]  (Abnormal) Collected: 12/15/23 0554    Specimen: Blood Updated: 12/15/23 0638     WBC 9.70 10*3/mm3      RBC 3.93 10*6/mm3      Hemoglobin 12.1 g/dL      Hematocrit 36.2 %      MCV 92.3 fL      MCH 30.8 pg      MCHC 33.4 g/dL      RDW 15.7 %      RDW-SD 50.3 fl      MPV 9.3 fL      Platelets 219 10*3/mm3      Neutrophil % 72.8 %      Lymphocyte % 16.4 %      Monocyte % 10.1 %      Eosinophil % 0.6 %      Basophil % 0.1 %      Neutrophils, Absolute 7.10 10*3/mm3      Lymphocytes, Absolute 1.60 10*3/mm3      Monocytes, Absolute 1.00 10*3/mm3      Eosinophils, Absolute 0.10 10*3/mm3      Basophils, Absolute 0.00 10*3/mm3      nRBC 0.1 /100 WBC     RPR [[id number]] Collected: 12/15/23 0554    Specimen: Blood Updated: 12/15/23 0635             Other Studies:       Assessment & Plan     Principal Problem:    Decreased fetal movement affecting management of pregnancy in third trimester, fetus 1         Assessment:  1.  Intrauterine pregnancy at 38w5d gestation with reactive fetal status.    2.  induction of labor  for gestational hypertension GEN gestational diabetes with favorable cervix  with favorable cervix and      3.  Obstetrical history significant for is non-contributory.  4.  GBS status:   External Strep Group B Ag   Date Value Ref Range Status   11/29/2023 Negative  Final       Plan:  1. Vaginal anticipated  2. Plan of care has been reviewed with patient.  3.  Risks, benefits of treatment plan have been discussed.  4.  All questions have been answered.  5.          Zohaib Santana MD   12/15/2023   08:16 EST

## 2023-12-16 ENCOUNTER — PATIENT OUTREACH (OUTPATIENT)
Dept: LABOR AND DELIVERY | Facility: HOSPITAL | Age: 33
End: 2023-12-16
Payer: MEDICAID

## 2023-12-16 LAB
BASOPHILS # BLD AUTO: 0 10*3/MM3 (ref 0–0.2)
BASOPHILS NFR BLD AUTO: 0.2 % (ref 0–1.5)
DEPRECATED RDW RBC AUTO: 52.5 FL (ref 37–54)
EOSINOPHIL # BLD AUTO: 0 10*3/MM3 (ref 0–0.4)
EOSINOPHIL NFR BLD AUTO: 0.1 % (ref 0.3–6.2)
ERYTHROCYTE [DISTWIDTH] IN BLOOD BY AUTOMATED COUNT: 15.8 % (ref 12.3–15.4)
HCT VFR BLD AUTO: 33.7 % (ref 34–46.6)
HGB BLD-MCNC: 11.1 G/DL (ref 12–15.9)
LYMPHOCYTES # BLD AUTO: 1.3 10*3/MM3 (ref 0.7–3.1)
LYMPHOCYTES NFR BLD AUTO: 8.1 % (ref 19.6–45.3)
MCH RBC QN AUTO: 30 PG (ref 26.6–33)
MCHC RBC AUTO-ENTMCNC: 32.9 G/DL (ref 31.5–35.7)
MCV RBC AUTO: 91.2 FL (ref 79–97)
MONOCYTES # BLD AUTO: 1.1 10*3/MM3 (ref 0.1–0.9)
MONOCYTES NFR BLD AUTO: 6.8 % (ref 5–12)
NEUTROPHILS NFR BLD AUTO: 13.6 10*3/MM3 (ref 1.7–7)
NEUTROPHILS NFR BLD AUTO: 84.8 % (ref 42.7–76)
NRBC BLD AUTO-RTO: 0 /100 WBC (ref 0–0.2)
PLATELET # BLD AUTO: 194 10*3/MM3 (ref 140–450)
PMV BLD AUTO: 9.7 FL (ref 6–12)
RBC # BLD AUTO: 3.7 10*6/MM3 (ref 3.77–5.28)
WBC NRBC COR # BLD AUTO: 16 10*3/MM3 (ref 3.4–10.8)

## 2023-12-16 PROCEDURE — 85025 COMPLETE CBC W/AUTO DIFF WBC: CPT | Performed by: OBSTETRICS & GYNECOLOGY

## 2023-12-16 RX ORDER — IBUPROFEN 600 MG/1
600 TABLET ORAL EVERY 6 HOURS PRN
Status: DISCONTINUED | OUTPATIENT
Start: 2023-12-16 | End: 2023-12-17 | Stop reason: HOSPADM

## 2023-12-16 RX ORDER — ACETAMINOPHEN 325 MG/1
650 TABLET ORAL EVERY 6 HOURS PRN
Status: DISCONTINUED | OUTPATIENT
Start: 2023-12-16 | End: 2023-12-17 | Stop reason: HOSPADM

## 2023-12-16 RX ORDER — HYDROCODONE BITARTRATE AND ACETAMINOPHEN 10; 325 MG/1; MG/1
1 TABLET ORAL EVERY 4 HOURS PRN
Status: DISCONTINUED | OUTPATIENT
Start: 2023-12-16 | End: 2023-12-17 | Stop reason: HOSPADM

## 2023-12-16 RX ORDER — LABETALOL 100 MG/1
100 TABLET, FILM COATED ORAL EVERY 12 HOURS SCHEDULED
Status: DISCONTINUED | OUTPATIENT
Start: 2023-12-16 | End: 2023-12-17

## 2023-12-16 RX ORDER — HYDROCORTISONE ACETATE PRAMOXINE HCL 2.5; 1 G/100G; G/100G
1 CREAM TOPICAL AS NEEDED
Status: DISCONTINUED | OUTPATIENT
Start: 2023-12-16 | End: 2023-12-17 | Stop reason: HOSPADM

## 2023-12-16 RX ORDER — ONDANSETRON 4 MG/1
4 TABLET, FILM COATED ORAL EVERY 8 HOURS PRN
Status: DISCONTINUED | OUTPATIENT
Start: 2023-12-16 | End: 2023-12-17 | Stop reason: HOSPADM

## 2023-12-16 RX ORDER — DOCUSATE SODIUM 100 MG/1
100 CAPSULE, LIQUID FILLED ORAL 2 TIMES DAILY
Status: DISCONTINUED | OUTPATIENT
Start: 2023-12-16 | End: 2023-12-17 | Stop reason: HOSPADM

## 2023-12-16 RX ORDER — HYDROCODONE BITARTRATE AND ACETAMINOPHEN 5; 325 MG/1; MG/1
1 TABLET ORAL EVERY 4 HOURS PRN
Status: DISCONTINUED | OUTPATIENT
Start: 2023-12-16 | End: 2023-12-17 | Stop reason: HOSPADM

## 2023-12-16 RX ORDER — OXYTOCIN-SODIUM CHLORIDE 0.9% IV SOLN 30 UNIT/500ML 30-0.9/5 UT/ML-%
125 SOLUTION INTRAVENOUS CONTINUOUS PRN
Status: DISCONTINUED | OUTPATIENT
Start: 2023-12-16 | End: 2023-12-17 | Stop reason: HOSPADM

## 2023-12-16 RX ORDER — BISACODYL 10 MG
10 SUPPOSITORY, RECTAL RECTAL DAILY PRN
Status: DISCONTINUED | OUTPATIENT
Start: 2023-12-16 | End: 2023-12-17 | Stop reason: HOSPADM

## 2023-12-16 RX ORDER — PRENATAL VIT/IRON FUM/FOLIC AC 27MG-0.8MG
1 TABLET ORAL DAILY
Status: DISCONTINUED | OUTPATIENT
Start: 2023-12-16 | End: 2023-12-17 | Stop reason: HOSPADM

## 2023-12-16 RX ADMIN — HYDROCORTISONE ACETATE, PRAMOXINE HCL 1 APPLICATION: 2.5; 1 CREAM TOPICAL at 01:58

## 2023-12-16 RX ADMIN — DOCUSATE SODIUM 100 MG: 100 CAPSULE, LIQUID FILLED ORAL at 20:49

## 2023-12-16 RX ADMIN — LABETALOL HYDROCHLORIDE 100 MG: 100 TABLET, FILM COATED ORAL at 20:49

## 2023-12-16 RX ADMIN — IBUPROFEN 600 MG: 600 TABLET, FILM COATED ORAL at 09:43

## 2023-12-16 RX ADMIN — Medication 1 G: at 07:27

## 2023-12-16 RX ADMIN — DOCUSATE SODIUM 100 MG: 100 CAPSULE, LIQUID FILLED ORAL at 09:39

## 2023-12-16 RX ADMIN — PRENATAL VITAMINS-IRON FUMARATE 27 MG IRON-FOLIC ACID 0.8 MG TABLET 1 TABLET: at 09:39

## 2023-12-16 RX ADMIN — WITCH HAZEL: 500 SOLUTION RECTAL; TOPICAL at 01:58

## 2023-12-16 RX ADMIN — WITCH HAZEL: 500 SOLUTION RECTAL; TOPICAL at 07:27

## 2023-12-16 RX ADMIN — LABETALOL HYDROCHLORIDE 100 MG: 100 TABLET, FILM COATED ORAL at 09:39

## 2023-12-16 NOTE — ANESTHESIA POSTPROCEDURE EVALUATION
Patient: Alda Waite    Procedure Summary       Date: 12/15/23 Room / Location:     Anesthesia Start: 1235 Anesthesia Stop: 2317    Procedure: LABOR ANALGESIA Diagnosis:     Scheduled Providers:  Provider: Dayday Mccormick MD    Anesthesia Type: epidural ASA Status: 2            Anesthesia Type: No value filed.    Vitals  Vitals Value Taken Time   /70 12/16/23 0300   Temp 98.6 °F (37 °C) 12/16/23 0300   Pulse 103 12/16/23 0300   Resp 17 12/16/23 0300   SpO2 96 % 12/16/23 0300           Post Anesthesia Care and Evaluation    Patient location during evaluation: PACU  Patient participation: complete - patient participated  Level of consciousness: awake  Pain scale: See nurse's notes for pain score.  Pain management: adequate    Airway patency: patent  Anesthetic complications: No anesthetic complications  PONV Status: none  Cardiovascular status: acceptable  Respiratory status: acceptable and spontaneous ventilation  Hydration status: acceptable  Post Neuraxial Block status: Motor and sensory function returned to baseline and No signs or symptoms of PDPH  Comments: Patient seen and examined postoperatively; vital signs stable; SpO2 greater than or equal to 90%; cardiopulmonary status stable; nausea/vomiting adequately controlled; pain adequately controlled; no apparent anesthesia complications; patient discharged from anesthesia care when discharge criteria were met

## 2023-12-16 NOTE — LACTATION NOTE
This note was copied from a baby's chart.  Provided mother with handouts, nipple cream and gel pads, instructed on use. Denies history of breast surgery. States that she routinely takes Lisinopril and zyrtec. Denies wool allergy. Has a [employer] breast pump. Uses WIC in [facility]. Baby sleepy, attempted to breastfeed. Several attempts made, baby would latch but not suck. When baby did suck she pushed nipple out with her tongue. Finally attempted with a nipple shield and  1cc of glucose water. Baby did feed for about 5 mins. Some swallowing noticed. Mother states she formula fed her 1st baby. Pumped and fed 2nd baby for 6 weeks. Encouraged to call for assist as needed.

## 2023-12-16 NOTE — PLAN OF CARE
Goal Outcome Evaluation:               Pt appears to be resting well. Her pain is under control at this time. Bonding well with baby and breast feeding.

## 2023-12-16 NOTE — PROGRESS NOTES
MEAGAN Talley  Postpartum Note    Subjective   Postpartum Day 1:  Spontaneous Vaginal Delivery    Patient without complaints. Her pain is well controlled with nonsteroidal anti-inflammatory drugs. She is ambulating well.  Patient describes her bleeding as thin lochia.    Breastfeeding: without difficulty.    Objective     Vitals:  Vitals:    12/16/23 0131 12/16/23 0300 12/16/23 0735 12/16/23 1136   BP: 159/63 133/70 125/78 148/83   BP Location:  Right arm Right arm Right arm   Patient Position:  Sitting Sitting Sitting   Pulse: 100 103 89 90   Resp:  17 16 18   Temp:  98.6 °F (37 °C) 98 °F (36.7 °C)    TempSrc:  Oral Oral    SpO2:  96% 97% 98%   Weight:       Height:           Physical Exam:  General:  no acute distress.  Abdomen: Soft, appropriately tender, ND   Fundus firm, below the umbilicus  Extremities: normal,  trace edema.     Labs:  Lab Results (last 72 hours)       Procedure Component Value Units Date/Time    CBC & Differential [[id number]]  (Abnormal) Collected: 12/16/23 0556    Specimen: Blood Updated: 12/16/23 0645    Narrative:      The following orders were created for panel order CBC & Differential.  Procedure                               Abnormality         Status                     ---------                               -----------         ------                     CBC Auto Differential[[id number]]        Abnormal            Final result                 Please view results for these tests on the individual orders.    CBC Auto Differential [[id number]]  (Abnormal) Collected: 12/16/23 0556    Specimen: Blood Updated: 12/16/23 0645     WBC 16.00 10*3/mm3      RBC 3.70 10*6/mm3      Hemoglobin 11.1 g/dL      Hematocrit 33.7 %      MCV 91.2 fL      MCH 30.0 pg      MCHC 32.9 g/dL      RDW 15.8 %      RDW-SD 52.5 fl      MPV 9.7 fL      Platelets 194 10*3/mm3      Neutrophil % 84.8 %      Lymphocyte % 8.1 %      Monocyte % 6.8 %      Eosinophil % 0.1 %      Basophil % 0.2 %      Neutrophils, Absolute 13.60  10*3/mm3      Lymphocytes, Absolute 1.30 10*3/mm3      Monocytes, Absolute 1.10 10*3/mm3      Eosinophils, Absolute 0.00 10*3/mm3      Basophils, Absolute 0.00 10*3/mm3      nRBC 0.0 /100 WBC     RPR [[id number]]  (Normal) Collected: 12/15/23 0554    Specimen: Blood Updated: 12/15/23 1840     RPR Non-Reactive    SCANNED - LABS [[id number]] Resulted: 12/15/23     Updated: 12/15/23 0758    SCANNED - LABS [[id number]] Resulted: 12/15/23     Updated: 12/15/23 0757    CBC & Differential [[id number]]  (Abnormal) Collected: 12/15/23 0554    Specimen: Blood Updated: 12/15/23 0638    Narrative:      The following orders were created for panel order CBC & Differential.  Procedure                               Abnormality         Status                     ---------                               -----------         ------                     CBC Auto Differential[[id number]]        Abnormal            Final result                 Please view results for these tests on the individual orders.    CBC Auto Differential [[id number]]  (Abnormal) Collected: 12/15/23 0554    Specimen: Blood Updated: 12/15/23 0638     WBC 9.70 10*3/mm3      RBC 3.93 10*6/mm3      Hemoglobin 12.1 g/dL      Hematocrit 36.2 %      MCV 92.3 fL      MCH 30.8 pg      MCHC 33.4 g/dL      RDW 15.7 %      RDW-SD 50.3 fl      MPV 9.3 fL      Platelets 219 10*3/mm3      Neutrophil % 72.8 %      Lymphocyte % 16.4 %      Monocyte % 10.1 %      Eosinophil % 0.6 %      Basophil % 0.1 %      Neutrophils, Absolute 7.10 10*3/mm3      Lymphocytes, Absolute 1.60 10*3/mm3      Monocytes, Absolute 1.00 10*3/mm3      Eosinophils, Absolute 0.10 10*3/mm3      Basophils, Absolute 0.00 10*3/mm3      nRBC 0.1 /100 WBC              Feeding method: Breastfeeding Status: Yes     Blood Type: RH Positive        Assessment & Plan     Principal Problem:    Decreased fetal movement affecting management of pregnancy in third trimester, fetus 1      Alda Waite is Day 1  post-partum  from a  Spontaneous Vaginal Delivery      Plan:  Continue current care.  Monitor BP closely, on labetalol 100 mg bid      Erin Villarreal MD  12/16/2023  14:17 EST

## 2023-12-16 NOTE — CASE MANAGEMENT/SOCIAL WORK
Social Work Assessment  [facility]     Patient Name: Alda Waite  MRN: [id number]  Today's Date: 12/16/2023    Admit Date: 12/15/2023     Discharge Plan       Row Name 12/16/23 0848       Plan    Plan Comments Consulted regarding PPD screen/discharge planning. Spoke to patient briefly who was with lactation nurse. Will follow-up at later time.             GO Lackey, MSSW, [facility]    Phone: [phone]  Cell: [phone]  Fax: [phone]  [email or web address]

## 2023-12-16 NOTE — CASE MANAGEMENT/SOCIAL WORK
Social Work Assessment  [facility]     Patient Name: Alda Waite  MRN: [id number]  Today's Date: 2023    Admit Date: 12/15/2023     Discharge Plan       Row Name 23 1039       Plan    Plan Routine home at d/c. PPD     Plan Comments Consulted for PPD (elevated EPDS score on screen). Spoke with patient. Introduced self/reason for consult. Congratulated on birth of . Touched based on how patient was feeling. Supportive resources provided for PPD & reviewed (copies emailed to [email or web address]). History of PPD with first born and reports receiving medication management at the time, but not presently on medications for mental health. Confirmed no additional questions.  Confirmed patient having necessary items for infant at home and identified positive support system to help assist as needed. Will self refer for [facility] (already established). LSW to follow as needed.        GO Lackey, MSSW, [facility]    Phone: [phone]  Cell: [phone]  Fax: [phone]  [email or web address]

## 2023-12-16 NOTE — L&D DELIVERY NOTE
[facility]  Vaginal Delivery Note    Diagnosis     Patient is a 33 y.o. female  currently at 38w5d, who presents with orders for induction of labor.    Pregnancy complicated by chronic hypertension and gestational diabetes class a 2.      Delivery     Delivery:  Spontaneous Vaginal Delivery    Date of Delivery:  12/15/2023   Anesthesia:      Delivering clinician: Zohaib Santana MD      Delivery narrative: Presents for an induction with a favorable cervix.  Fetal heart tones are reassuring.  Repeat strep screen is negative    Blood sugars are well-controlled managed with Callie.  Blood pressure was well-controlled managed with labetalol    Normal progression to the active phase.  Prolonged second stage of labor lasting over 2-1/2 hours.  Infant was noted to be in occiput posterior position was rotated nearly and delivered out of an occiput transverse position    Will bulb suction on the perineum.  Delayed cord sampling was observed.  Sternal delivered spontaneously intact.  Uterus contracted well and lochia became scant.    There was maternal temperature of 102 degrees just prior to delivery.  There was no tachycardia or loss of variability however in the infant's tracing    Infant    Findings: VFI     Apgars: 9 and 9 at 1 and 5 minutes.           Placenta, Cord, and Fluid    Placenta delivered  spontaneous  clear   Episiotomy              n/a  Lacerations       none   Estimated Blood Loss 300 mL     Complications  hypertension.  Diabetes.  Maternal temperature              Zohaib Santana MD  12/15/23  23:15 EST

## 2023-12-16 NOTE — OUTREACH NOTE
[facility]  IP Postpartum    Questions/Answers      Flowsheet Row Responses   Best Method for Contacting Cell   Support Person Present Yes   Does the patient have a car seat at the hospital Yes   Delivery Note Reviewed Reviewed   Were birth expectations met? Yes   Is there a need for additional support/resources? No   Lactation Note Reviewed Reviewed   Is additional support needed? No   Any questions or concerns? No   Confirm Postpartum OB appointment Yes  [states she already made appt with Dr Santana for 6week follow up]   Confirm initial well-child Pediatrician appointment date/time: Yes  [discussed babys follow up set up with discharge]   Does patient have transportation to appointments? Yes   Any other assistance needed to ensure she is able to attend appointments? No   Does patient have supplies needed at home for  care? Breast Pump, Crib, Clothing, Diapers            Erin Scruggs RN  Maternity Nurse Navigator    2023, 16:31 EST

## 2023-12-17 VITALS
TEMPERATURE: 98 F | WEIGHT: 240.52 LBS | BODY MASS INDEX: 38.66 KG/M2 | DIASTOLIC BLOOD PRESSURE: 77 MMHG | HEIGHT: 66 IN | RESPIRATION RATE: 19 BRPM | OXYGEN SATURATION: 97 % | SYSTOLIC BLOOD PRESSURE: 128 MMHG | HEART RATE: 77 BPM

## 2023-12-17 RX ORDER — LABETALOL 200 MG/1
200 TABLET, FILM COATED ORAL EVERY 12 HOURS SCHEDULED
Status: DISCONTINUED | OUTPATIENT
Start: 2023-12-17 | End: 2023-12-17 | Stop reason: HOSPADM

## 2023-12-17 RX ORDER — LABETALOL 100 MG/1
200 TABLET, FILM COATED ORAL 2 TIMES DAILY
Qty: 60 TABLET | Refills: 2 | Status: SHIPPED | OUTPATIENT
Start: 2023-12-17

## 2023-12-17 RX ORDER — IBUPROFEN 600 MG/1
600 TABLET ORAL EVERY 6 HOURS PRN
Qty: 30 TABLET | Refills: 0 | Status: SHIPPED | OUTPATIENT
Start: 2023-12-17

## 2023-12-17 RX ORDER — LABETALOL 100 MG/1
100 TABLET, FILM COATED ORAL ONCE
Status: COMPLETED | OUTPATIENT
Start: 2023-12-17 | End: 2023-12-17

## 2023-12-17 RX ADMIN — LABETALOL HYDROCHLORIDE 100 MG: 100 TABLET, FILM COATED ORAL at 08:20

## 2023-12-17 RX ADMIN — LABETALOL HYDROCHLORIDE 100 MG: 100 TABLET, FILM COATED ORAL at 09:16

## 2023-12-17 RX ADMIN — PRENATAL VITAMINS-IRON FUMARATE 27 MG IRON-FOLIC ACID 0.8 MG TABLET 1 TABLET: at 08:20

## 2023-12-17 RX ADMIN — IBUPROFEN 600 MG: 600 TABLET, FILM COATED ORAL at 09:16

## 2023-12-17 RX ADMIN — IBUPROFEN 600 MG: 600 TABLET, FILM COATED ORAL at 03:06

## 2023-12-17 RX ADMIN — DOCUSATE SODIUM 100 MG: 100 CAPSULE, LIQUID FILLED ORAL at 08:20

## 2023-12-17 NOTE — NURSING NOTE
Went in to pts room for rounding, pt sleeping with infant in the bed. Spoke to pt regarding the need to put infant in the crib when sleepy. Also spoke to pt about feeding infant more frequently.

## 2023-12-17 NOTE — DISCHARGE SUMMARY
[facility]  Delivery Discharge Summary    Primary OB Clinician: Zohaib Santana MD    Admission Diagnosis:  Principal Problem:    Decreased fetal movement affecting management of pregnancy in third trimester, fetus 1      Discharge Diagnosis:  S/p     Gestational Age: 38w5d    Date of Delivery: 12/15/2023     Delivered By:  Zohaib Santana     Delivery Type: Vaginal, Spontaneous      Intrapartum Course: Uncomplicated delivery.     Postpartum Course:  Patient's postpartum course has been uncomplicated.  Her bleeding is minimal, and her pain is controlled.  She is ambulating and tolerating regular diet.  She is breast-feeding.  She is being discharged home today.  She will follow-up for her postpartum visit.      Physical Exam:    Vitals:   Vitals:    23 2337 23 0300 23 0710 23 1150   BP: 145/75 147/77 159/82 128/77   BP Location: Right arm Right arm Right arm Right arm   Patient Position: Lying Lying Sitting Sitting   Pulse: 75 84 81 77   Resp:    Temp: 98.1 °F (36.7 °C) 98.2 °F (36.8 °C) 97.9 °F (36.6 °C) 98 °F (36.7 °C)   TempSrc: Oral Oral Oral Oral   SpO2: 97% 97% 97% 97%   Weight:       Height:         Temp (24hrs), Av °F (36.7 °C), Min:97.4 °F (36.3 °C), Max:98.2 °F (36.8 °C)      General Appearance:    Alert, cooperative, in no acute distress   Abdomen:     Soft non-tender, non-distended, no guarding, no rebound         tenderness.   Extremities:   Moves all extremities well, no edema, no cyanosis, no             redness.   Fundus:   Firm, below umbilicus     Feeding method: Breastfeeding Status: Yes    Blood Type: RH Positive      Plan:  Discharge to home.    Follow-up appointment this week for BP check.

## 2023-12-17 NOTE — PLAN OF CARE
Goal Outcome Evaluation:  Plan of Care Reviewed With: patient      Patient to be discharged per MD order. Patient's fundus firm and bleeding WNL. Patient's pain well controlled with PRN Motrin. Patient to follow up with OB in 3 days for blood pressure check, and in 6 weeks for routine postpartum follow-up.

## 2023-12-17 NOTE — LACTATION NOTE
This note was copied from a baby's chart.  Mother states that she offers baby breast. Sometimes the baby will feed at breast,if she doesn't she will have dad feed the baby formula as mother pumps. Encouraged to pump routinely to establish and maintain her volume. Plans discharge today. Discussed first night at home. Provided with 's discharge weight ticket and lactation contact card. Encouraged to call as needed.

## 2023-12-17 NOTE — PLAN OF CARE
Goal Outcome Evaluation:  Plan of Care Reviewed With: patient        Progress: improving pt is doing well pain is controlled with motrin. She is voiding and passing gas. She is ambulating room and breastfeeding infant well.

## 2023-12-17 NOTE — NURSING NOTE
Spoke to pt regarding the need to feed infant pt stated she wanted to let her sleep a little longer.

## 2023-12-17 NOTE — DISCHARGE INSTR - APPOINTMENTS
Call to schedule follow up appointment with Dr. Santana this week for a blood pressure check, in 3 days.    Call to schedule routine postpartum follow-up appointment with Dr. Santana in 6 weeks.

## 2023-12-18 NOTE — PAYOR COMM NOTE
"This is delivery notification only for Lita Ugalde    Pt had a vaginal delivery on 12/15/23.    Please note: This is NOT an inpatient request for prior authorization. This is just notification of delivery. If the patient exceeds the 48/96 federal guideline allowed for delivery, a prior auth form/request will be submitted. Thank you.       Lita Ugalde (33 y.o. Female)       Date of Birth   1990    Social Security Number       Address   [address] [place] [address] [address] [place] [address]    Home Phone       MRN   [id number]       Sabianist   None    Marital Status   Single                            Admission Date   12/15/23    Admission Type   Elective    Admitting Provider   Zohaib Santana MD    Attending Provider       Department, Room/Bed   [facility], M400/1       Discharge Date   12/17/2023    Discharge Disposition   Home or Self Care    Discharge Destination                                 Attending Provider: (none)   Allergies: No Known Allergies    Isolation: None   Infection: None   Code Status: Prior    Ht: 167.6 cm (66\")   Wt: 109 kg (240 lb 8.4 oz)    Admission Cmt: None   Principal Problem: Decreased fetal movement affecting management of pregnancy in third trimester, fetus 1 [O36.8131]                   Active Insurance as of 12/15/2023       Primary Coverage       Payor Plan Insurance Group Employer/Plan Group    [employer] [employer] [place] [facility]        Payor Plan Address Payor Plan Phone Number Payor Plan Fax Number Effective Dates    [address] [phone]  5/1/2023 - None Entered    [facility] [place] [address]         Subscriber Name Subscriber Birth Date Member ID       LITA UGALDE 1990 [id number]                     Emergency Contacts        (Rel.) Home Phone Work Phone Mobile Phone    Kehinde Henley (Significant Other) -- -- [phone]                 Operative/Procedure Notes (last 7 days)    "     Zohaib Santana MD at 12/15/23 2871          [facility]  Vaginal Delivery Note    Diagnosis     Patient is a 33 y.o. female  currently at 38w5d, who presents with orders for induction of labor.    Pregnancy complicated by chronic hypertension and gestational diabetes class a 2.      Delivery     Delivery:  Spontaneous Vaginal Delivery    Date of Delivery:  12/15/2023   Anesthesia:      Delivering clinician: Zohaib Santana MD      Delivery narrative: Presents for an induction with a favorable cervix.  Fetal heart tones are reassuring.  Repeat strep screen is negative    Blood sugars are well-controlled managed with Callie.  Blood pressure was well-controlled managed with labetalol    Normal progression to the active phase.  Prolonged second stage of labor lasting over 2-1/2 hours.  Infant was noted to be in occiput posterior position was rotated nearly and delivered out of an occiput transverse position    Will bulb suction on the perineum.  Delayed cord sampling was observed.  Sternal delivered spontaneously intact.  Uterus contracted well and lochia became scant.    There was maternal temperature of 102 degrees just prior to delivery.  There was no tachycardia or loss of variability however in the infant's tracing    Infant    Findings: VFI     Apgars: 9 and 9 at 1 and 5 minutes.           Placenta, Cord, and Fluid    Placenta delivered  spontaneous  clear   Episiotomy              n/a  Lacerations       none   Estimated Blood Loss 300 mL     Complications  hypertension.  Diabetes.  Maternal temperature              Zohaib Santana MD  12/15/23  23:15 EST          Electronically signed by Zohaib Santana MD at 12/15/23 9052          Discharge Summary        Erin Villarreal MD at 23 1154          [facility]  Delivery Discharge Summary    Primary OB Clinician: Zohaib Santana MD    Admission Diagnosis:  Principal Problem:    Decreased fetal movement affecting management of  pregnancy in third trimester, fetus 1      Discharge Diagnosis:  S/p     Gestational Age: 38w5d    Date of Delivery: 12/15/2023     Delivered By:  Zohaib Santana     Delivery Type: Vaginal, Spontaneous      Intrapartum Course: Uncomplicated delivery.     Postpartum Course:  Patient's postpartum course has been uncomplicated.  Her bleeding is minimal, and her pain is controlled.  She is ambulating and tolerating regular diet.  She is breast-feeding.  She is being discharged home today.  She will follow-up for her postpartum visit.      Physical Exam:    Vitals:   Vitals:    23 2337 23 0300 23 0710 23 1150   BP: 145/75 147/77 159/82 128/77   BP Location: Right arm Right arm Right arm Right arm   Patient Position: Lying Lying Sitting Sitting   Pulse: 75 84 81 77   Resp:    Temp: 98.1 °F (36.7 °C) 98.2 °F (36.8 °C) 97.9 °F (36.6 °C) 98 °F (36.7 °C)   TempSrc: Oral Oral Oral Oral   SpO2: 97% 97% 97% 97%   Weight:       Height:         Temp (24hrs), Av °F (36.7 °C), Min:97.4 °F (36.3 °C), Max:98.2 °F (36.8 °C)      General Appearance:    Alert, cooperative, in no acute distress   Abdomen:     Soft non-tender, non-distended, no guarding, no rebound         tenderness.   Extremities:   Moves all extremities well, no edema, no cyanosis, no             redness.   Fundus:   Firm, below umbilicus     Feeding method: Breastfeeding Status: Yes    Blood Type: RH Positive      Plan:  Discharge to home.    Follow-up appointment this week for BP check.          Electronically signed by Erin Villarreal MD at 23 [phone]

## 2023-12-18 NOTE — SIGNIFICANT NOTE
Case Management Discharge Note                Selected Continued Care - Discharged on 12/17/2023 Admission date: 12/15/2023 - Discharge disposition: Home or Self Care              Transportation Services  Private: Car    Final Discharge Disposition Code: (P) 01 - home or self-care

## 2023-12-21 ENCOUNTER — HOSPITAL ENCOUNTER (EMERGENCY)
Facility: HOSPITAL | Age: 33
Discharge: HOME | End: 2023-12-22
Attending: EMERGENCY MEDICINE | Admitting: OBSTETRICS & GYNECOLOGY
Payer: MEDICAID

## 2023-12-21 DIAGNOSIS — I10 HYPERTENSION, UNSPECIFIED TYPE: Primary | ICD-10-CM

## 2023-12-21 LAB
ALBUMIN SERPL-MCNC: 4.3 G/DL (ref 3.5–5.2)
ALBUMIN/GLOB SERPL: 1.4 G/DL
ALP SERPL-CCNC: 118 U/L (ref 39–117)
ALT SERPL W P-5'-P-CCNC: 38 U/L (ref 1–33)
ANION GAP SERPL CALCULATED.3IONS-SCNC: 13 MMOL/L (ref 5–15)
AST SERPL-CCNC: 29 U/L (ref 1–32)
BACTERIA UR QL AUTO: ABNORMAL /HPF
BASOPHILS # BLD AUTO: 0 10*3/MM3 (ref 0–0.2)
BASOPHILS NFR BLD AUTO: 0.5 % (ref 0–1.5)
BILIRUB SERPL-MCNC: 0.2 MG/DL (ref 0–1.2)
BILIRUB UR QL STRIP: NEGATIVE
BUN SERPL-MCNC: 12 MG/DL (ref 6–20)
BUN/CREAT SERPL: 16.9 (ref 7–25)
CALCIUM SPEC-SCNC: 9.8 MG/DL (ref 8.6–10.5)
CHLORIDE SERPL-SCNC: 98 MMOL/L (ref 98–107)
CLARITY UR: CLEAR
CO2 SERPL-SCNC: 28 MMOL/L (ref 22–29)
COLOR UR: YELLOW
CREAT SERPL-MCNC: 0.71 MG/DL (ref 0.57–1)
DEPRECATED RDW RBC AUTO: 50.3 FL (ref 37–54)
EGFRCR SERPLBLD CKD-EPI 2021: 114.6 ML/MIN/1.73
EOSINOPHIL # BLD AUTO: 0.2 10*3/MM3 (ref 0–0.4)
EOSINOPHIL NFR BLD AUTO: 2.3 % (ref 0.3–6.2)
ERYTHROCYTE [DISTWIDTH] IN BLOOD BY AUTOMATED COUNT: 15.3 % (ref 12.3–15.4)
GLOBULIN UR ELPH-MCNC: 3.1 GM/DL
GLUCOSE SERPL-MCNC: 131 MG/DL (ref 65–99)
GLUCOSE UR STRIP-MCNC: NEGATIVE MG/DL
HCT VFR BLD AUTO: 37.6 % (ref 34–46.6)
HGB BLD-MCNC: 12.6 G/DL (ref 12–15.9)
HGB UR QL STRIP.AUTO: ABNORMAL
HYALINE CASTS UR QL AUTO: ABNORMAL /LPF
KETONES UR QL STRIP: NEGATIVE
LEUKOCYTE ESTERASE UR QL STRIP.AUTO: ABNORMAL
LYMPHOCYTES # BLD AUTO: 1.7 10*3/MM3 (ref 0.7–3.1)
LYMPHOCYTES NFR BLD AUTO: 19.4 % (ref 19.6–45.3)
MAGNESIUM SERPL-MCNC: 1.8 MG/DL (ref 1.6–2.6)
MCH RBC QN AUTO: 30.2 PG (ref 26.6–33)
MCHC RBC AUTO-ENTMCNC: 33.5 G/DL (ref 31.5–35.7)
MCV RBC AUTO: 90.1 FL (ref 79–97)
MONOCYTES # BLD AUTO: 0.7 10*3/MM3 (ref 0.1–0.9)
MONOCYTES NFR BLD AUTO: 7.4 % (ref 5–12)
NEUTROPHILS NFR BLD AUTO: 6.3 10*3/MM3 (ref 1.7–7)
NEUTROPHILS NFR BLD AUTO: 70.4 % (ref 42.7–76)
NITRITE UR QL STRIP: NEGATIVE
NRBC BLD AUTO-RTO: 0.1 /100 WBC (ref 0–0.2)
PH UR STRIP.AUTO: 6.5 [PH] (ref 5–8)
PLATELET # BLD AUTO: 273 10*3/MM3 (ref 140–450)
PMV BLD AUTO: 9.2 FL (ref 6–12)
POTASSIUM SERPL-SCNC: 3.8 MMOL/L (ref 3.5–5.2)
PROT SERPL-MCNC: 7.4 G/DL (ref 6–8.5)
PROT UR QL STRIP: NEGATIVE
RBC # BLD AUTO: 4.17 10*6/MM3 (ref 3.77–5.28)
RBC # UR STRIP: ABNORMAL /HPF
REF LAB TEST METHOD: ABNORMAL
SODIUM SERPL-SCNC: 139 MMOL/L (ref 136–145)
SP GR UR STRIP: 1.01 (ref 1–1.03)
SQUAMOUS #/AREA URNS HPF: ABNORMAL /HPF
UROBILINOGEN UR QL STRIP: ABNORMAL
WBC # UR STRIP: ABNORMAL /HPF
WBC NRBC COR # BLD AUTO: 9 10*3/MM3 (ref 3.4–10.8)

## 2023-12-21 PROCEDURE — P9612 CATHETERIZE FOR URINE SPEC: HCPCS

## 2023-12-21 PROCEDURE — 80053 COMPREHEN METABOLIC PANEL: CPT | Performed by: NURSE PRACTITIONER

## 2023-12-21 PROCEDURE — 87086 URINE CULTURE/COLONY COUNT: CPT | Performed by: NURSE PRACTITIONER

## 2023-12-21 PROCEDURE — 96374 THER/PROPH/DIAG INJ IV PUSH: CPT

## 2023-12-21 PROCEDURE — 83735 ASSAY OF MAGNESIUM: CPT | Performed by: NURSE PRACTITIONER

## 2023-12-21 PROCEDURE — 99284 EMERGENCY DEPT VISIT MOD MDM: CPT

## 2023-12-21 PROCEDURE — 85025 COMPLETE CBC W/AUTO DIFF WBC: CPT | Performed by: NURSE PRACTITIONER

## 2023-12-21 PROCEDURE — 81001 URINALYSIS AUTO W/SCOPE: CPT | Performed by: NURSE PRACTITIONER

## 2023-12-21 PROCEDURE — 93005 ELECTROCARDIOGRAM TRACING: CPT | Performed by: NURSE PRACTITIONER

## 2023-12-21 PROCEDURE — 96376 TX/PRO/DX INJ SAME DRUG ADON: CPT

## 2023-12-21 RX ORDER — HYDROCHLOROTHIAZIDE 25 MG/1
25 TABLET ORAL DAILY
COMMUNITY
Start: 2023-12-20

## 2023-12-21 RX ORDER — SODIUM CHLORIDE 0.9 % (FLUSH) 0.9 %
10 SYRINGE (ML) INJECTION AS NEEDED
Status: DISCONTINUED | OUTPATIENT
Start: 2023-12-21 | End: 2023-12-22 | Stop reason: HOSPADM

## 2023-12-21 RX ORDER — LABETALOL HYDROCHLORIDE 5 MG/ML
20-80 INJECTION, SOLUTION INTRAVENOUS
Status: DISCONTINUED | OUTPATIENT
Start: 2023-12-21 | End: 2023-12-22 | Stop reason: HOSPADM

## 2023-12-21 RX ORDER — ACETAMINOPHEN 500 MG
1000 TABLET ORAL ONCE
Status: COMPLETED | OUTPATIENT
Start: 2023-12-21 | End: 2023-12-21

## 2023-12-21 RX ADMIN — Medication 20 MG: at 20:46

## 2023-12-21 RX ADMIN — Medication 40 MG: at 21:17

## 2023-12-21 RX ADMIN — ACETAMINOPHEN 1000 MG: 500 TABLET ORAL at 21:32

## 2023-12-21 NOTE — Clinical Note
Level of Care: Telemetry [5]   Admitting Physician: UMU HAWTHORNE [[id number]]   Attending Physician: UMU HAWTHORNE [[id number]]

## 2023-12-22 VITALS
SYSTOLIC BLOOD PRESSURE: 141 MMHG | RESPIRATION RATE: 16 BRPM | WEIGHT: 229.5 LBS | BODY MASS INDEX: 36.88 KG/M2 | TEMPERATURE: 97.5 F | HEIGHT: 66 IN | HEART RATE: 72 BPM | OXYGEN SATURATION: 96 % | DIASTOLIC BLOOD PRESSURE: 86 MMHG

## 2023-12-22 LAB
QT INTERVAL: 377 MS
QTC INTERVAL: 427 MS

## 2023-12-22 RX ORDER — LABETALOL 100 MG/1
100 TABLET, FILM COATED ORAL EVERY 12 HOURS SCHEDULED
Status: DISCONTINUED | OUTPATIENT
Start: 2023-12-22 | End: 2023-12-22

## 2023-12-22 RX ORDER — LABETALOL 200 MG/1
200 TABLET, FILM COATED ORAL EVERY 12 HOURS SCHEDULED
Status: DISCONTINUED | OUTPATIENT
Start: 2023-12-22 | End: 2023-12-22 | Stop reason: HOSPADM

## 2023-12-22 RX ORDER — IBUPROFEN 400 MG/1
400 TABLET ORAL EVERY 6 HOURS PRN
Status: DISCONTINUED | OUTPATIENT
Start: 2023-12-22 | End: 2023-12-22 | Stop reason: HOSPADM

## 2023-12-22 RX ADMIN — LABETALOL HYDROCHLORIDE 100 MG: 100 TABLET, FILM COATED ORAL at 01:51

## 2023-12-22 RX ADMIN — IBUPROFEN 400 MG: 400 TABLET, FILM COATED ORAL at 01:54

## 2023-12-22 NOTE — ED NOTES
Spoke to Kerrie PRATER in OB regarding pt HTN with being 6 days postpartum. Pt has chronic HTN, was on labetolol during pregnancy and is now taking lisinopril again, 20mg daily.

## 2023-12-22 NOTE — CONSULTS
[facility] Medicine Services  Consult Note    Patient Name: Alda Waite  : 1990  MRN: [id number]  Primary Care Physician:  Penelope Gomez APRN  Referring Physician: BUDDY Alfaro  Date of admission: 2023  Date and Time of Care: 0016 at 23    Consults      Reason for Consult/ Chief Complaint: HTN    Consult Requested By: Dr. Santana    Subjective:     History of Present Illness:   Alda is a 33F with PMHx of HTN, anxiety, gestational diabetes, and pre diabetes who presented to [facility] on 2023 due to HTN.  Patient states her BP is uncontrolled causing her to experience fatigue, weakness, and headaches.Denies chest pain, dyspnea, vision change, numbness, and tingling.  She is 6 days post-partum.  She says BP was better controlled during pregnancy when on PO Labetalol, but she recently stopped that and went back on Lisinopril which has not been controlling BP. States she was started on a diuretic yesterday but her SBP was >170mmhg today with symptoms.  Presented to [facility] for evaluation.    CXR shows mild cardiomegaly with no evidence of active lung disease    Review of Systems:   Review of Systems  12 point ROS reviewed and negative except as mentioned above      Personal History:     Past Medical History:   Diagnosis Date    Allergies     Anxiety     Chlamydia     , ,     Gestational diabetes     Hypertension     Migraine     Pre-diabetes        Past Surgical History:   Procedure Laterality Date    TONSILLECTOMY         Family History: family history includes Alcohol abuse in her maternal grandmother and maternal uncle; Diabetes in her maternal grandmother; Hypertension in her maternal aunt, maternal grandfather, maternal grandmother, maternal uncle, and mother; Uterine cancer in her maternal grandmother. Otherwise pertinent FHx was reviewed and not pertinent to current issue.    Social History:  reports that she has quit smoking. She has never used  smokeless tobacco. She reports that she does not currently use alcohol. She reports that she does not use drugs.    Home Medications:   Accu-Chek Guide, Pen Needles, aspirin, cetirizine, ferrous sulfate, hydroCHLOROthiazide, ibuprofen, labetalol, and prenatal vitamin    Allergies:  No Known Allergies      Objective:     Vital Signs  Temp:  [97.7 °F (36.5 °C)] 97.7 °F (36.5 °C)  Heart Rate:  [73-87] 79  Resp:  [18] 18  BP: (137-185)/(79-99) 149/90   Body mass index is 37.04 kg/m².    Physical Exam  Physical Exam  Constitutional:       General: She is not in acute distress.     Appearance: She is obese. She is not toxic-appearing.   HENT:      Head: Normocephalic and atraumatic.      Nose: Nose normal. No congestion.      Mouth/Throat:      Pharynx: Oropharynx is clear. No oropharyngeal exudate.   Eyes:      General: No scleral icterus.  Cardiovascular:      Rate and Rhythm: Normal rate and regular rhythm.      Heart sounds: No murmur heard.     No friction rub. No gallop.   Pulmonary:      Effort: No respiratory distress.      Breath sounds: No wheezing or rales.   Abdominal:      General: There is no distension.      Tenderness: There is no abdominal tenderness. There is no guarding.   Musculoskeletal:         General: No swelling, deformity or signs of injury.      Cervical back: Normal range of motion. No rigidity.   Skin:     Coloration: Skin is not jaundiced.      Findings: No bruising.   Neurological:      General: No focal deficit present.      Mental Status: She is alert and oriented to person, place, and time.      Motor: No weakness.         Scheduled Meds       PRN Meds     labetalol    [COMPLETED] Insert Peripheral IV **AND** sodium chloride   Infusions         Diagnostic Data    Results from last 7 days   Lab Units 12/21/23 2027   WBC 10*3/mm3 9.00   HEMOGLOBIN g/dL 12.6   HEMATOCRIT % 37.6   PLATELETS 10*3/mm3 273   GLUCOSE mg/dL 131*   CREATININE mg/dL 0.71   BUN mg/dL 12   SODIUM mmol/L 139    POTASSIUM mmol/L 3.8   AST (SGOT) U/L 29   ALT (SGPT) U/L 38*   ALK PHOS U/L 118*   BILIRUBIN mg/dL 0.2   ANION GAP mmol/L 13.0       No radiology results for the last day      I reviewed the patient's new clinical results.  I reviewed the patient's other test results and agree with the interpretation  I personally viewed and interpreted the patient's EKG/Telemetry data    Assessment/Plan:     Active and Resolved Problems  There are no hospital problems to display for this patient.      #Post-partum HTN    - 6 days post partum    - BP was 185/94 in ED    - Start Labetalol 100mg PO BID, titrate dose up as needed    - will increase Labetalol to 200mg PO BID     - Labetalol 10mg IV PRN with parameters    - monitor off other diabetic medications    #Pre-diabetes  #Hyperglycemia    - glucose 131 on admission    - check A1c    - ISS        DVT prophylaxis: SCDs      Code status is   There are no questions and answers to display.       Plan for disposition:home in 1-2 days    Time: 30 minutes        Signature: Electronically signed by Alexia Araujo DO, 12/22/23, 00:14 EST.  [facility] Hospitalist Team

## 2023-12-22 NOTE — H&P
Date of Discharge:  2023    Presenting Problem/History of Present Illness:  There are no hospital problems to display for this patient.          Discharge Diagnosis: Postpartum.  Chronic hypertension.  Gestational diabetes    Procedures Performed:       Hospital observation    Consults:   Consults       Date and Time Order Name Status Description    2023 11:23 PM Inpatient Hospitalist Consult      2023  9:02 PM OB/GYN (on-call MD unless specified)              Hospital Course:  Patient is a 33 y.o. female  currently 1 week postpartum, presented with not feeling well and elevated blood pressures at home.    She had severe range systolic hypertension which required IV labetalol to get under control.  She had been previously seen in the office where she was restarted on her lisinopril and hydrochlorothiazide was added    The patient monitors her blood pressure at home which was elevated.  She had a mild headache and presented to the emergency room    Overnight while at bedrest she was normotensive after receiving however multiple doses of IV labetalol to break the systolic hypertensive event..    She was dismissed home from the emergency room with oral labetalol added to her lisinopril and hydrochlorothiazide.  She is able to monitor her blood pressure at home.  She was given instructions to limit her activities until her hypertension has resolved    Pertinent Test Results:   Lab Results (last 72 hours)       Procedure Component Value Units Date/Time    Magnesium [[id number]]  (Normal) Collected: 23    Specimen: Blood Updated: 23     Magnesium 1.8 mg/dL     Comprehensive Metabolic Panel [[id number]]  (Abnormal) Collected: 23    Specimen: Blood Updated: 23     Glucose 131 mg/dL      BUN 12 mg/dL      Creatinine 0.71 mg/dL      Sodium 139 mmol/L      Potassium 3.8 mmol/L      Chloride 98 mmol/L      CO2 28.0 mmol/L      Calcium 9.8 mg/dL      Total  Protein 7.4 g/dL      Albumin 4.3 g/dL      ALT (SGPT) 38 U/L      AST (SGOT) 29 U/L      Alkaline Phosphatase 118 U/L      Total Bilirubin 0.2 mg/dL      Globulin 3.1 gm/dL      A/G Ratio 1.4 g/dL      BUN/Creatinine Ratio 16.9     Anion Gap 13.0 mmol/L      eGFR 114.6 mL/min/1.73     Narrative:      GFR Normal >60  Chronic Kidney Disease <60  Kidney Failure <15      CBC & Differential [[id number]]  (Abnormal) Collected: 12/21/23 2027    Specimen: Blood Updated: 12/21/23 2103    Narrative:      The following orders were created for panel order CBC & Differential.  Procedure                               Abnormality         Status                     ---------                               -----------         ------                     CBC Auto Differential[[id number]]        Abnormal            Final result                 Please view results for these tests on the individual orders.    CBC Auto Differential [[id number]]  (Abnormal) Collected: 12/21/23 2027    Specimen: Blood Updated: 12/21/23 2103     WBC 9.00 10*3/mm3      RBC 4.17 10*6/mm3      Hemoglobin 12.6 g/dL      Hematocrit 37.6 %      MCV 90.1 fL      MCH 30.2 pg      MCHC 33.5 g/dL      RDW 15.3 %      RDW-SD 50.3 fl      MPV 9.2 fL      Platelets 273 10*3/mm3      Neutrophil % 70.4 %      Lymphocyte % 19.4 %      Monocyte % 7.4 %      Eosinophil % 2.3 %      Basophil % 0.5 %      Neutrophils, Absolute 6.30 10*3/mm3      Lymphocytes, Absolute 1.70 10*3/mm3      Monocytes, Absolute 0.70 10*3/mm3      Eosinophils, Absolute 0.20 10*3/mm3      Basophils, Absolute 0.00 10*3/mm3      nRBC 0.1 /100 WBC     Urinalysis With Culture If Indicated - Urine, Catheter [[id number]]  (Abnormal) Collected: 12/21/23 2044    Specimen: Urine, Catheter Updated: 12/21/23 2100     Color, UA Yellow     Appearance, UA Clear     pH, UA 6.5     Specific Gravity, UA 1.012     Glucose, UA Negative     Ketones, UA Negative     Bilirubin, UA Negative     Blood, UA Large (3+)     Protein,  UA Negative     Leuk Esterase, UA Small (1+)     Nitrite, UA Negative     Urobilinogen, UA 1.0 E.U./dL    Narrative:      In absence of clinical symptoms, the presence of pyuria, bacteria, and/or nitrites on the urinalysis result does not correlate with infection.    Urinalysis, Microscopic Only - Urine, Catheter [[id number]]  (Abnormal) Collected: 12/21/23 2044    Specimen: Urine, Catheter Updated: 12/21/23 2100     RBC, UA Too Numerous to Count /HPF      WBC, UA 6-10 /HPF      Bacteria, UA None Seen /HPF      Squamous Epithelial Cells, UA 0-2 /HPF      Hyaline Casts, UA None Seen /LPF      Methodology Automated Microscopy    Urine Culture - Urine, Urine, Catheter [[id number]] Collected: 12/21/23 2044    Specimen: Urine, Catheter Updated: 12/21/23 2055          Imaging Results (Last 72 Hours)       ** No results found for the last 72 hours. **            Condition on Discharge:  Good    Vital Signs:  Vitals:    12/22/23 0151 12/22/23 0200 12/22/23 0300 12/22/23 0400   BP: 136/71 133/65 141/77 142/83   BP Location:    Right arm   Patient Position:    Lying   Pulse: 92 85 81 80   Resp:    16   Temp:    97.5 °F (36.4 °C)   TempSrc:    Oral   SpO2:  96% 95% 94%   Weight:       Height:           Physical Exam:     General Appearance:    Alert, cooperative, in no acute distress   Lungs:     Clear to auscultation,respirations regular, even and                   unlabored    Heart:    Regular rhythm and normal rate.    Breast Exam:    Deferred   Abdomen:     Normal bowel sounds, no masses, no organomegaly, soft        non-tender, non-distended, no guarding, no rebound                 tenderness   Genitalia:    Deferred            Discharge Disposition:  Home    Discharge Medications:     Discharge Medications        Continue These Medications        Instructions Start Date   Accu-Chek Guide w/Device kit   1 Device, Does not apply, Daily      aspirin 81 MG oral suspension       ferrous sulfate 325 (65 FE) MG EC tablet   TAKE  1 TABLET BY MOUTH TWICE DAILY ON EMPTY STOMACHWITH SOMETHING ACIDIC      hydroCHLOROthiazide 25 MG tablet  Commonly known as: HYDRODIURIL   25 mg, Oral, Daily      ibuprofen 600 MG tablet  Commonly known as: ADVIL,MOTRIN   600 mg, Oral, Every 6 Hours PRN      labetalol 100 MG tablet  Commonly known as: NORMODYNE   200 mg, Oral, 2 Times Daily      Pen Needles 32G X 4 MM misc   1 each, Does not apply, 2 Times Daily      prenatal (CLASSIC) vitamin 28-0.8 MG tablet tablet  Generic drug: prenatal vitamin   Oral, Daily             Stop These Medications      cetirizine 10 MG tablet  Commonly known as: zyrTEC              Activity at Discharge:   Activity Instructions       Bathing Restrictions      Type of Restriction: Bathing    Bathing Restrictions: No Tub Bath    Driving Restrictions      Type of Restriction: Driving    Driving Restrictions: No Driving While Taking Narcotics    Housework Restrictions      Type of Restriction: Housework    Explain Housework Restrictions: limit household chores    Lifting Restrictions      Type of Restriction: Lifting    Lifting Restrictions: No Lifting Until Cleared By Provider    Sexual Activity Restrictions      Type of Restriction: Sex    Explain Sexual Activity Restrictions: Pelvic rest for 6 weeks.    Work Restrictions      Will discuss return to work at next appointment.    Type of Restriction: Work    May Return to Work: After Next Appointment    With / Without Restrictions: Without Restrictions            Follow-up Appointments  Future Appointments   Date Time Provider Department Center   2/27/2024  1:00 PM Ajith Aguayo MD [facility]     [unfilled]    Test Results Pending at Discharge  Pending Labs       Order Current Status    Urine Culture - Urine, Urine, Catheter In process             Zohaib Santana MD  12/22/23  07:05 EST

## 2023-12-22 NOTE — ED PROVIDER NOTES
Subjective   Chief Complaint   Patient presents with    Hypertension       History of Present Illness  Patient is a 33-year-old female, 6 days postpartum after induced labor at 38 weeks 5 days, had spontaneous vaginal delivery.  Patient noted to have hypertension prior to pregnancy, and was on medication during pregnancy for hypertension, she was taking labetalol.  After delivery she reports she continued to have elevated blood pressure readings, was seen in the office yesterday for a blood pressure recheck, was reinitiated on her lisinopril 20 mg, as well as hydrochlorothiazide 25 mg.  Patient reports a mild headache.  No thunderclap headache.  No visual disturbances.  No abnormal bleeding.  No fevers.  Denies leg swelling  Review of Systems   Constitutional:  Negative for chills and fever.   Eyes:  Negative for photophobia and visual disturbance.   Respiratory:  Negative for shortness of breath.    Cardiovascular:  Negative for chest pain.   Gastrointestinal:  Negative for abdominal pain, diarrhea, nausea and vomiting.   Genitourinary:  Negative for vaginal bleeding (Patient reports normal postpartum bleeding.  But no abnormal vaginal bleeding.).   Musculoskeletal:  Negative for back pain and neck pain.   Skin:  Negative for rash.   Neurological:  Positive for headaches. Negative for dizziness, tremors, seizures, syncope, facial asymmetry, speech difficulty, weakness, light-headedness and numbness.       Past Medical History:   Diagnosis Date    Allergies     Anxiety     Chlamydia     2012, 2013, 2014    Gestational diabetes     Hypertension     Migraine     Pre-diabetes        No Known Allergies    Past Surgical History:   Procedure Laterality Date    TONSILLECTOMY         Family History   Problem Relation Age of Onset    Hypertension Mother     Hypertension Maternal Aunt     Alcohol abuse Maternal Uncle     Hypertension Maternal Uncle     Alcohol abuse Maternal Grandmother     Hypertension Maternal Grandmother      Diabetes Maternal Grandmother     Uterine cancer Maternal Grandmother     Hypertension Maternal Grandfather        Social History     Socioeconomic History    Marital status: Single    Number of children: 2    Years of education: 12   Tobacco Use    Smoking status: Former    Smokeless tobacco: Never   Vaping Use    Vaping Use: Never used   Substance and Sexual Activity    Alcohol use: Not Currently    Drug use: Never    Sexual activity: Defer           Objective   Physical Exam  Vitals and nursing note reviewed.   Constitutional:       Appearance: Normal appearance. She is not toxic-appearing.   HENT:      Head: Normocephalic and atraumatic.      Nose: Nose normal.      Mouth/Throat:      Mouth: Mucous membranes are moist.      Pharynx: Oropharynx is clear.   Eyes:      Extraocular Movements: Extraocular movements intact.      Conjunctiva/sclera: Conjunctivae normal.      Pupils: Pupils are equal, round, and reactive to light.   Cardiovascular:      Rate and Rhythm: Normal rate and regular rhythm.      Heart sounds: No murmur heard.     No friction rub. No gallop.   Pulmonary:      Effort: Pulmonary effort is normal.   Abdominal:      General: Bowel sounds are normal.      Palpations: Abdomen is soft.      Tenderness: There is no abdominal tenderness. There is no guarding or rebound.   Musculoskeletal:         General: Normal range of motion.      Cervical back: Normal range of motion and neck supple.      Right lower leg: No edema.      Left lower leg: No edema.   Skin:     General: Skin is warm and dry.      Capillary Refill: Capillary refill takes less than 2 seconds.   Neurological:      Mental Status: She is alert and oriented to person, place, and time.         Procedures           ED Course  ED Course as of 12/22/23 0412   Thu Dec 21, 2023   2025 Discussed with ED attending physician. Rashard initiate labetolol for htn treatment. Following protocol  [LB]   2106 BP remains above recommendation. Ordered  labteolol per protocol now 40mg IV x 1. Placed consult to OB [LB]   2119 Discussed with Dr. Santana, will continue materal hypertension management for now.    [LB]   2122 Pt reports very mild HA. No visual changes. Tylenol ordered.  [LB]   2136 BP: 143/83 [LB]   2145 BP: 154/93 [LB]   2252 Pt reports feeling well. No headache at this time.  [LB]   2252 BP: 149/82 [LB]   2306 BP: 150/88 [LB]   2329 EKG sinus rhythm rate of 77.  Number to previous 1/28/2021.  No acute ST changes. [LB]   2335 Consult made to hospitalist, Dr. Araujo [LB]      ED Course User Index  [LB] Paula Sandy, BUDDY                                             Medical Decision Making  Problems Addressed:  Hypertension, unspecified type: complicated acute illness or injury    Amount and/or Complexity of Data Reviewed  Labs: ordered.  ECG/medicine tests: ordered.    Risk  OTC drugs.  Prescription drug management.  Decision regarding hospitalization.    Discussed with ED attending physician Dr. Rangel  Chart Review: Delivery note 12/15/2023    Labs: CBC CMP reviewed magnesium 1.8.  No proteinuria.  Platelets normal.  ALT 38, alk phos 118.    Imaging: Consider head CT, however patient is no thunderclap headache, she describes her headache is mild.  No neurosymptoms.  Not the worst headache of her life    Discussion/Consultation with other providers:consultation to Dr. Santana, OB/GYN    33-year-old female, currently 6 days postpartum presents with elevated blood pressure readings.  She is a history of hypertension prior to pregnancy, as well as start her pregnancy.  Recently had medication change in office yesterday.  Initial blood pressure readings noted to be elevated, patient initiated on protocol for maternal hypertension with labetalol.  Given 20 mg of labetalol IV push, BP remained elevated, she was then given 40 mg.  After this patient's blood pressure remained systolic under 160, and diastolic .  Patient will be admitted to OB  service, Dr. Ramachandran requested hospitalist consult.  Made to Dr. Araujo    Disposition: Patient requiring multiple doses of IV blood pressure medication, postpartum hypertension, she will be admitted for continued monitoring.  Admitted to OB/GYN, with consultation to the hospitalist.    Note Disclaimer: At [facility], we believe that sharing information builds trust and better relationships. You are receiving this note because you recently visited [facility]. It is possible you will see health information before a provider has talked with you about it. This kind of information can be easy to misunderstand. To help you fully understand what it means for your health, we urge you to discuss this note with your provider.Note dictated utilizing Dragon Dictation. Appropriate PPE worn during patient interactions.        Final diagnoses:   Hypertension, unspecified type       ED Disposition  ED Disposition       ED Disposition   Decision to Admit    Condition   --    Comment   Level of Care: Telemetry [5]   Admitting Physician: UMU HAWTHORNE [[id number]]   Attending Physician: UMU HAWTHORNE [[id number]]                 No follow-up provider specified.       Medication List      No changes were made to your prescriptions during this visit.            Paula Sandy, APRN  12/22/23 0412

## 2023-12-23 LAB — BACTERIA SPEC AEROBE CULT: NO GROWTH

## 2023-12-29 ENCOUNTER — PATIENT OUTREACH (OUTPATIENT)
Dept: LABOR AND DELIVERY | Facility: HOSPITAL | Age: 33
End: 2023-12-29
Payer: MEDICAID

## 2023-12-29 NOTE — OUTREACH NOTE
Motherhood Connection  Postpartum Check-In    Questions/Answers      Flowsheet Row Responses   Visit Setting Telephone   Best Method for Contacting Cell   OB Discharge Note Reviewed  Reviewed   OB Discharge Medications Reviewed  Reviewed    discharged home with mother? Yes   Current Pain Levels 0-10 0   At Rest Pain Levels 0-10 0   Pain level with activity 0-10 0   Acceptable Pain Level 0-10 0   Verbalized Emotional State Acceptance   Family/Support Network Family, Significant Other   Level of Involvement in Care Attentive, Interactive, Supportive   Do you feel comfortable in your relationship with your baby? Yes   Have members of your household adjusted to your baby? Yes   Is the baby's father supportive and/or involved with the baby? Yes   How does your partner feel about the baby? Happy, Involved   Do you feel safe at home, school and work? Yes   Are you in a relationship with someone who threatens you or hurts you? No   Do you have the resources to keep yourself and your baby healthy and safe? Yes   Lochia (per patient report) Brown-carrie Red   Amount Spotting   Number of pads per day 1   Lochia Odor None   Is patient breastfeeding? Yes, pumping   Frequency 2-3hrs, also using similac 360 formula   Postpartum Depression Screening Education Education Provided  [stressed calling Dr Santana for s/s ppd, to ED if thoughts of harming self or baby]   Doctor Appointments: Education Provided  [f/u appt with Dr Santana 1/3, instructed to call for increased BP, HA, visual changes, RUQ pain]   Breastfeeding Education Education Provided   Postpartum Care Education Education Provided   S & S to report Education Provided  [PPD, HTN, bleeding warning signs]   Followup Appointments Made Yes   Well Child Visit Appointments Made Yes   Appointment Date 24   Provider/Agency Dr Santana   Well Child Checkup Provider Name Dr Mejia   Well Child Check Up Date: 24   Umbilical Cord No reported signs or symptoms   Infant  Feeding Method Breast, Formula   Frequency of feedings 2-3 HOURS   Formula Type Other   Other Formula Similac 360   Number of wet diapers x 24 hours 6   Last BM x 24 hours 1   Emesis (Unmeasured Occurence) 0   What safe sleep surface is available? Bassinet   Are there stuffed animals, toys, pillows, quilts, blankets, wedges, positioners, bumpers or other loose bedding in the infant's sleeping environment? No   Where does the baby usually sleep? Bassinet   Does the baby ever share a sleep surface with a sibling, adult or pet? No   Does the baby ever share a sleep surface in a bed, couch, recliner or other? No   What position do you place your baby to sleep for naps? Back   What position do you place your baby to sleep at night Back   Are you and/or other caregivers smoking inside or outside the baby's home? No   Is the infant dressed appropiately for the temperature of the home? Yes  [footed sleeper]   Do you use a clean, dry pacifier that is not attached to a string or stuffed animal? Yes   Comment reviewed safe sleep protocol                Most Recent [place]  Depression Scale Score (EPDS) sent in [facility]     Performed by a clinician: 16 in patient on            Erin Scruggs RN  Maternity Nurse Navigator    2023, 15:06 EST

## 2024-01-05 ENCOUNTER — PATIENT OUTREACH (OUTPATIENT)
Dept: CALL CENTER | Facility: HOSPITAL | Age: 34
End: 2024-01-05
Payer: MEDICAID

## 2024-02-27 ENCOUNTER — OFFICE VISIT (OUTPATIENT)
Dept: ENDOCRINOLOGY | Facility: CLINIC | Age: 34
End: 2024-02-27
Payer: MEDICAID

## 2024-02-27 VITALS
BODY MASS INDEX: 36.48 KG/M2 | HEIGHT: 66 IN | DIASTOLIC BLOOD PRESSURE: 70 MMHG | WEIGHT: 227 LBS | OXYGEN SATURATION: 98 % | SYSTOLIC BLOOD PRESSURE: 132 MMHG | HEART RATE: 82 BPM

## 2024-02-27 DIAGNOSIS — I10 ESSENTIAL HYPERTENSION: ICD-10-CM

## 2024-02-27 RX ORDER — NORETHINDRONE ACETATE AND ETHINYL ESTRADIOL, ETHINYL ESTRADIOL AND FERROUS FUMARATE 1MG-10(24)
1 KIT ORAL DAILY
COMMUNITY
Start: 2024-02-14

## 2024-02-27 RX ORDER — LISINOPRIL 20 MG/1
1 TABLET ORAL DAILY
COMMUNITY
Start: 2024-02-14

## 2024-02-27 RX ORDER — CETIRIZINE HYDROCHLORIDE 10 MG/1
TABLET ORAL
COMMUNITY
Start: 2024-01-29

## 2024-02-27 NOTE — PROGRESS NOTES
Endocrine Progress Note Outpatient     Patient Care Team:  Penelope Gomez APRN as PCP - General (Family Medicine)    Chief Complaint: Follow-up gestational diabetes    HPI: This is a 33-year-old female who was seen here back in November 2023 when she was 36 weeks pregnant.  She is now status post delivery.  Baby weighed 7 pounds and 5 ounces.  She did require insulin during the pregnancy but she is not taking insulin at this time.  She has been trying to follow her diet the best she can.      Hypertension: She is currently on lisinopril.    She is not pregnant at this time.    Her current weight is about prepregnancy weight now.  She is not sure she will have pregnancies in the future but at this time she is not planning.    Past Medical History:   Diagnosis Date    Allergies     Anxiety     Chlamydia     2012, 2013, 2014    Gestational diabetes     Hypertension     Migraine     Pre-diabetes        Social History     Socioeconomic History    Marital status: Single    Number of children: 2    Years of education: 12   Tobacco Use    Smoking status: Former    Smokeless tobacco: Never   Vaping Use    Vaping Use: Never used   Substance and Sexual Activity    Alcohol use: Not Currently    Drug use: Never    Sexual activity: Defer       Family History   Problem Relation Age of Onset    Hypertension Mother     Hypertension Maternal Aunt     Alcohol abuse Maternal Uncle     Hypertension Maternal Uncle     Alcohol abuse Maternal Grandmother     Hypertension Maternal Grandmother     Diabetes Maternal Grandmother     Uterine cancer Maternal Grandmother     Hypertension Maternal Grandfather        No Known Allergies    ROS:   Constitutional:  Denies fatigue, tiredness.    Eyes:  Denies change in visual acuity   HENT:  Denies nasal congestion or sore throat   Respiratory: denies cough, shortness of breath.   Cardiovascular:  denies chest pain, edema   GI:  Denies abdominal pain, nausea, vomiting.   Musculoskeletal:  Denies back  pain or joint pain   Integument:  Denies dry skin and rash   Neurologic:  Denies headache, focal weakness or sensory changes   Endocrine:  Denies polyuria or polydipsia   Psychiatric:  Denies depression or anxiety      Vitals:    02/27/24 1308   BP: 132/70   Pulse: 82   SpO2: 98%       Body mass index is 36.64 kg/m².     Physical Exam:  GEN: NAD, conversant  EYES: EOMI,  NECK: no thyromegaly,  CV: RRR,  LUNG: CTA  PSYCH: AOX3, appropriate mood, affect normal      Results Review:     I reviewed the patient's new clinical results.    Lab Results   Component Value Date    HGBA1C 6.20 (H) 11/28/2023    HGBA1C 6.20 (H) 10/24/2023    HGBA1C 6.40 (H) 09/21/2023      Lab Results   Component Value Date    GLUCOSE 131 (H) 12/21/2023    BUN 12 12/21/2023    CREATININE 0.71 12/21/2023    EGFRIFAFRI >60 01/10/2023    BCR 16.9 12/21/2023    K 3.8 12/21/2023    CO2 28.0 12/21/2023    CALCIUM 9.8 12/21/2023    ALBUMIN 4.3 12/21/2023    LABIL2 1.3 08/02/2022    AST 29 12/21/2023    ALT 38 (H) 12/21/2023    TRIG 87 07/13/2020     (H) 07/13/2020    HDL 62 07/13/2020     Lab Results   Component Value Date    TSH 0.805 01/28/2021         Medication Review: Reviewed.       Current Outpatient Medications:     Blood Glucose Monitoring Suppl (Accu-Chek Guide) w/Device kit, 1 Device Daily., Disp: 1 kit, Rfl: 0    cetirizine (zyrTEC) 10 MG tablet, TAKE 1 TABLET BY MOUTH EVERY DAY AS NEEDED for allergies or Rhinitis, Disp: , Rfl:     ferrous sulfate 325 (65 FE) MG EC tablet, TAKE 1 TABLET BY MOUTH TWICE DAILY ON EMPTY STOMACHWITH SOMETHING ACIDIC, Disp: , Rfl:     hydroCHLOROthiazide (HYDRODIURIL) 25 MG tablet, Take 1 tablet by mouth Daily., Disp: , Rfl:     ibuprofen (ADVIL,MOTRIN) 600 MG tablet, Take 1 tablet by mouth Every 6 (Six) Hours As Needed for Mild Pain (First Line: Mild pain.)., Disp: 30 tablet, Rfl: 0    Insulin Pen Needle (Pen Needles) 32G X 4 MM misc, 1 each 2 (Two) Times a Day., Disp: 90 each, Rfl: 7    lisinopril  (PRINIVIL,ZESTRIL) 20 MG tablet, Take 1 tablet by mouth Daily., Disp: , Rfl:     Lo Loestrin Fe 1 MG-10 MCG / 10 MCG tablet, Take 1 tablet by mouth Daily., Disp: , Rfl:       Assessment and plan:  Gestational diabetes mellitus: Status post delivery on December 15, 2023.  Baby weighed 7 5 ounces.  She is now back to her prepregnancy weight.  She needs to work on her diet and try to lose more weight her target weight is about 200 pounds.  She is at high risk for developing type 2 diabetes and she will be considered as gestational diabetic if she becomes pregnant in the future.  She verbalizes understanding.    Hypertension: Well controlled. Continue lisinopril.       Assessment and plan from November 28, 2023 reviewed and updated.    Ajith Aguayo MD FACE.

## 2024-02-27 NOTE — PATIENT INSTRUCTIONS
Please continue to work on your diet and activity as you remain high risk for developing type 2 diabetes.  Your goal weight is 200 pounds of weight  Labs before follow-up.

## 2024-04-15 NOTE — OUTREACH NOTE
Motherhood Connection Survey      Flowsheet Row Responses   [facility] facility patient discharged from? [facility]   Week 1 attempt successful? No   Unsuccessful attempts Attempt 1   Reschedule Today   Revoke Phone number issues  [Called patient's number and lady answering states it is the wrong number.]              Norah JONES - Licensed Nurse           improved

## 2024-07-23 ENCOUNTER — PATIENT MESSAGE (OUTPATIENT)
Dept: ENDOCRINOLOGY | Facility: CLINIC | Age: 34
End: 2024-07-23
Payer: MEDICAID

## 2024-09-03 ENCOUNTER — LAB (OUTPATIENT)
Dept: LAB | Facility: HOSPITAL | Age: 34
End: 2024-09-03
Payer: MEDICAID

## 2024-09-03 DIAGNOSIS — I10 ESSENTIAL HYPERTENSION: ICD-10-CM

## 2024-09-03 LAB
ALBUMIN SERPL-MCNC: 4.4 G/DL (ref 3.5–5.2)
ALBUMIN/GLOB SERPL: 1.3 G/DL
ALP SERPL-CCNC: 100 U/L (ref 39–117)
ALT SERPL W P-5'-P-CCNC: 28 U/L (ref 1–33)
ANION GAP SERPL CALCULATED.3IONS-SCNC: 7.8 MMOL/L (ref 5–15)
AST SERPL-CCNC: 32 U/L (ref 1–32)
BILIRUB SERPL-MCNC: <0.2 MG/DL (ref 0–1.2)
BUN SERPL-MCNC: 8 MG/DL (ref 6–20)
BUN/CREAT SERPL: 11.1 (ref 7–25)
CALCIUM SPEC-SCNC: 10 MG/DL (ref 8.6–10.5)
CHLORIDE SERPL-SCNC: 103 MMOL/L (ref 98–107)
CO2 SERPL-SCNC: 26.2 MMOL/L (ref 22–29)
CREAT SERPL-MCNC: 0.72 MG/DL (ref 0.57–1)
EGFRCR SERPLBLD CKD-EPI 2021: 112.7 ML/MIN/1.73
GLOBULIN UR ELPH-MCNC: 3.4 GM/DL
GLUCOSE SERPL-MCNC: 146 MG/DL (ref 65–99)
HBA1C MFR BLD: 10.1 % (ref 4.8–5.6)
POTASSIUM SERPL-SCNC: 4.4 MMOL/L (ref 3.5–5.2)
PROT SERPL-MCNC: 7.8 G/DL (ref 6–8.5)
SODIUM SERPL-SCNC: 137 MMOL/L (ref 136–145)

## 2024-09-03 PROCEDURE — 80053 COMPREHEN METABOLIC PANEL: CPT

## 2024-09-03 PROCEDURE — 36415 COLL VENOUS BLD VENIPUNCTURE: CPT

## 2024-09-03 PROCEDURE — 83036 HEMOGLOBIN GLYCOSYLATED A1C: CPT

## 2024-09-09 RX ORDER — PANTOPRAZOLE SODIUM 20 MG/1
1 TABLET, DELAYED RELEASE ORAL DAILY
COMMUNITY
Start: 2024-06-09

## 2024-09-09 RX ORDER — INSULIN GLARGINE 100 [IU]/ML
10 INJECTION, SOLUTION SUBCUTANEOUS DAILY
COMMUNITY
Start: 2024-07-25 | End: 2025-07-25

## 2024-09-09 RX ORDER — OMEPRAZOLE 40 MG/1
40 CAPSULE, DELAYED RELEASE ORAL DAILY
COMMUNITY
Start: 2024-07-18 | End: 2025-07-18

## 2024-09-10 ENCOUNTER — OFFICE VISIT (OUTPATIENT)
Dept: ENDOCRINOLOGY | Facility: CLINIC | Age: 34
End: 2024-09-10
Payer: MEDICAID

## 2024-09-10 ENCOUNTER — SPECIALTY PHARMACY (OUTPATIENT)
Dept: ENDOCRINOLOGY | Facility: CLINIC | Age: 34
End: 2024-09-10
Payer: MEDICAID

## 2024-09-10 VITALS
OXYGEN SATURATION: 98 % | HEART RATE: 96 BPM | WEIGHT: 225 LBS | DIASTOLIC BLOOD PRESSURE: 70 MMHG | SYSTOLIC BLOOD PRESSURE: 135 MMHG | HEIGHT: 66 IN | BODY MASS INDEX: 36.16 KG/M2

## 2024-09-10 DIAGNOSIS — E11.65 TYPE 2 DIABETES MELLITUS WITH HYPERGLYCEMIA, WITH LONG-TERM CURRENT USE OF INSULIN: Primary | ICD-10-CM

## 2024-09-10 DIAGNOSIS — Z79.4 TYPE 2 DIABETES MELLITUS WITH HYPERGLYCEMIA, WITH LONG-TERM CURRENT USE OF INSULIN: Primary | ICD-10-CM

## 2024-09-10 DIAGNOSIS — I10 ESSENTIAL HYPERTENSION: ICD-10-CM

## 2024-09-10 LAB — GLUCOSE BLDC GLUCOMTR-MCNC: 159 MG/DL (ref 70–105)

## 2024-09-10 PROCEDURE — 1159F MED LIST DOCD IN RCRD: CPT | Performed by: INTERNAL MEDICINE

## 2024-09-10 PROCEDURE — 3078F DIAST BP <80 MM HG: CPT | Performed by: INTERNAL MEDICINE

## 2024-09-10 PROCEDURE — 82948 REAGENT STRIP/BLOOD GLUCOSE: CPT | Performed by: INTERNAL MEDICINE

## 2024-09-10 PROCEDURE — 1160F RVW MEDS BY RX/DR IN RCRD: CPT | Performed by: INTERNAL MEDICINE

## 2024-09-10 PROCEDURE — 3075F SYST BP GE 130 - 139MM HG: CPT | Performed by: INTERNAL MEDICINE

## 2024-09-10 PROCEDURE — 3046F HEMOGLOBIN A1C LEVEL >9.0%: CPT | Performed by: INTERNAL MEDICINE

## 2024-09-10 PROCEDURE — 99214 OFFICE O/P EST MOD 30 MIN: CPT | Performed by: INTERNAL MEDICINE

## 2024-09-10 NOTE — PATIENT INSTRUCTIONS
Start Mounjaro 2.5 mg subcu weekly for 4 weeks and if able to tolerate then increase the dose to 5 mg subcu weekly  Continue rest of the medication  Continue to work on diet and activity  Always keep glucose source in case of low blood sugar  I did send a prescription for freestyle peg sensor system, please see if your insurance will cover it to monitor your blood sugars and adjust the insulin dose  Labs before follow-up.

## 2024-09-10 NOTE — PROGRESS NOTES
Specialty Pharmacy Patient Management Program  Endocrinology Benefits Investigation      Alda is seen by a [facility] Endocrinology provider and is currently taking a target specialty medication.  The patient is INELIGIBLE for enrollment in the Endocrinology Patient Management Program offered by [facility] at this time. Patient may become eligible for the program in the future if the reason for ineligibility below changes.     Benefit Investigation  Reason: Insurance Restriction / No Payer Access    Education Provided for Specialty Medication  The patient has been provided with the following education and any applicable administration techniques (i.e. self-injection) have been demonstrated for the therapies indicated. All questions and concerns have been addressed prior to the patient receiving the medication, and the patient has verbalized understanding of the education and any materials provided.  Additional patient education shall be provided and documented upon request by the patient, provider or payer.        Mounjaro® (tirzepatide)   How long will I be on this medication for?  The amount of time you will be on this medication will be determined by your doctor based on blood sugar and A1c control. You will most likely be on this medication or another diabetes medication throughout your lifetime. Do not abruptly stop this medication without talking to your doctor first.     How do I take this medication?  Take as directed on your prescription label. Mounjaro is supplied in a single-use pen for each dose and you will use a new pre-filled pen each week.  It is injected under the skin (subcutaneously) of your stomach, thigh or upper arm.  You may inject in the same body area each week, on the same day each week, with or without food.      What are some possible side effects?  In addition to decreased appetite, the most common side effects are nausea and indigestion. Redness, itching,  and/or swelling at the injection site may occur. You should also monitor for low blood sugar (hypoglycemia) if you are taking Mounjaro with other medications that cause low blood sugar.     What happens if I miss a dose?  If you miss a dose, take it as soon as you remember as long as there are at least 3 days until the next scheduled dose.  If there are less than 3 days, skip the missed dose and resume  Mounjaro on the regularly scheduled day.  Do not take 2 doses at the same time or extra doses.      Medication Safety    What are things I should warn my doctor immediately about?  Do not use Mounjaro if you or a family member have ever had medullary thyroid cancer (MTC) or Multiple Endocrine Neoplasia syndrome type 2 (MEN 2).  Tell your doctor if you have or have had problems with your kidneys or pancreas.  Talk to your doctor if you are pregnant, planning to become pregnant, or breastfeeding. Stop using Mounjaro and get medical help right away if you have severe pain in your stomach area that will not go away, or if you notice any signs/symptoms of an allergic reaction (rash, hives, difficulty breathing, etc.).    What are things that I should be cautious of?  Be cautious of any side effects from this medication. Talk to your doctor if any new ones develop or aren't getting better.    What are some medications that can interact with this one?  Taking Mounjaro with other medications that also lower your blood sugar such as insulin and glipizide/glimepiride/glyburide may increase the risk of low blood sugar. Your doctor may reduce the dose of these medications when you start Mounjaro to minimize low blood sugars.  Always tell your doctor or pharmacist immediately if you start taking any new medications, including over-the-counter medications, vitamins, and herbal supplements.        Medication Storage/Handling    How should I handle this medication?  Keep this medication out of reach of pets/children and keep the pen  capped when not in use.  Do not share your medicine pens with others.    How does this medication need to be stored?  Store Mounjaro in the refrigerator. Do not freeze and do not use if Mounjaro has been frozen.  You may store your Mounjaro pens at room temperature for up to 21 days.  Protect from excessive heat and sunlight.  Keep in the original carton until time of administration.    How should I dispose of this medication?  Used Mounjaro pens should discarded after each use in an approved sharps container after use.  If you do not have a sharps container, you may use a household container made of heavy-duty plastic with a tight-fitting lid that is leak resistant (e.g., heavy-duty plastic laundry detergent bottle). If your doctor decides to stop this medication, take to your local police station for proper disposal. Some pharmacies also have take-back bins for medication drop-off.       Resources/Support    How can I remind myself to take this medication?  You can download reminder apps to help you manage your refills. You may also set an alarm on your phone to remind you.     Is financial support available?   [employer] can provide co-pay cards if you have commercial insurance or patient assistance if you have Medicare or no insurance.     Which vaccines are recommended for me?  Talk to your doctor about these vaccines: Flu, Coronavirus (COVID-19), Pneumococcal (pneumonia), Tdap, Hepatitis B, Zoster (shingles)         Changes to Therapy Plan Discussed with Patient  Medication Therapy Changes by Provider Started pt on Mounjaro therapy at this time.  Mounjaro 2.5mg weekly.  Additional Plans, Therapy Recommendations, or Therapy Problems to Be Addressed: None      Dennis Jenkins, PharmD, MPH  Clinical Specialty Pharmacist, Endocrinology  9/10/2024  16:18 EDT

## 2024-09-10 NOTE — PROGRESS NOTES
Endocrine Progress Note Outpatient     Patient Care Team:  Penelope Gomez APRN as PCP - General (Family Medicine)    Chief Complaint: Follow-up type 2 diabetes    HPI: This is a 33-year-old female was initially seen here for gestational diabetes she did deliver back in November 2023.  She is now have developed type 2 diabetes with A1c about 10%.  She has been prescribed metformin and Lantus by her primary care which she is taking.  She did bring in blood sugar records for review most of them are running about 200.    Hypertension: She is currently on lisinopril.    She is not pregnant at this time.      Past Medical History:   Diagnosis Date    Allergies     Anxiety     Chlamydia     2012, 2013, 2014    Gestational diabetes     Hypertension     Migraine     Pre-diabetes        Social History     Socioeconomic History    Marital status: Single    Number of children: 2    Years of education: 12   Tobacco Use    Smoking status: Former    Smokeless tobacco: Never   Vaping Use    Vaping status: Never Used   Substance and Sexual Activity    Alcohol use: Not Currently    Drug use: Never    Sexual activity: Defer       Family History   Problem Relation Age of Onset    Hypertension Mother     Hypertension Maternal Aunt     Alcohol abuse Maternal Uncle     Hypertension Maternal Uncle     Alcohol abuse Maternal Grandmother     Hypertension Maternal Grandmother     Diabetes Maternal Grandmother     Uterine cancer Maternal Grandmother     Hypertension Maternal Grandfather        Allergies   Allergen Reactions    Cat Hair Extract Itching and Other (See Comments)       ROS:   Constitutional:  Denies fatigue, tiredness.    Eyes:  Denies change in visual acuity   HENT:  Denies nasal congestion or sore throat   Respiratory: denies cough, shortness of breath.   Cardiovascular:  denies chest pain, edema   GI:  Denies abdominal pain, nausea, vomiting.   Musculoskeletal:  Denies back pain or joint pain   Integument:  Denies dry skin and  rash   Neurologic:  Denies headache, focal weakness or sensory changes   Endocrine:  Denies polyuria or polydipsia   Psychiatric:  Denies depression or anxiety      Vitals:    09/10/24 1546   BP: 135/70   Pulse: 96   SpO2: 98%       Body mass index is 36.32 kg/m².     Physical Exam:  GEN: NAD, conversant  EYES: EOMI,  NECK: no thyromegaly,  CV: RRR,  LUNG: CTA  PSYCH: AOX3, appropriate mood, affect normal      Results Review:     I reviewed the patient's new clinical results.    Lab Results   Component Value Date    HGBA1C 10.10 (H) 09/03/2024    HGBA1C 6.20 (H) 11/28/2023    HGBA1C 6.20 (H) 10/24/2023      Lab Results   Component Value Date    GLUCOSE 146 (H) 09/03/2024    BUN 8 09/03/2024    CREATININE 0.72 09/03/2024    EGFRIFAFRI >60 01/10/2023    BCR 11.1 09/03/2024    K 4.4 09/03/2024    CO2 26.2 09/03/2024    CALCIUM 10.0 09/03/2024    ALBUMIN 4.4 09/03/2024    LABIL2 1.3 08/02/2022    AST 32 09/03/2024    ALT 28 09/03/2024    TRIG 87 07/13/2020     (H) 07/13/2020    HDL 62 07/13/2020     Lab Results   Component Value Date    TSH 0.805 01/28/2021         Medication Review: Reviewed.       Current Outpatient Medications:     Blood Glucose Monitoring Suppl (Accu-Chek Guide) w/Device kit, 1 Device Daily., Disp: 1 kit, Rfl: 0    cetirizine (zyrTEC) 10 MG tablet, TAKE 1 TABLET BY MOUTH EVERY DAY AS NEEDED for allergies or Rhinitis, Disp: , Rfl:     ferrous sulfate 325 (65 FE) MG EC tablet, TAKE 1 TABLET BY MOUTH TWICE DAILY ON EMPTY STOMACHWITH SOMETHING ACIDIC, Disp: , Rfl:     Insulin Pen Needle (Pen Needles) 32G X 4 MM misc, 1 each 2 (Two) Times a Day., Disp: 90 each, Rfl: 7    Lantus SoloStar 100 UNIT/ML injection pen, Inject 10 Units under the skin into the appropriate area as directed Daily., Disp: , Rfl:     lisinopril (PRINIVIL,ZESTRIL) 20 MG tablet, Take 1 tablet by mouth Daily., Disp: , Rfl:     Lo Loestrin Fe 1 MG-10 MCG / 10 MCG tablet, Take 1 tablet by mouth Daily., Disp: , Rfl:     metFORMIN  (GLUCOPHAGE) 500 MG tablet, , Disp: , Rfl:     omeprazole (priLOSEC) 40 MG capsule, Take 1 capsule by mouth Daily., Disp: , Rfl:     pantoprazole (PROTONIX) 20 MG EC tablet, Take 1 tablet by mouth Daily., Disp: , Rfl:       Assessment and plan:  Diabetes mellitus type 2 with hyperglycemia: Uncontrolled, very high A1c of 10.10, at this time I will add Mounjaro 2.5 mg subcu weekly for 4 weeks and if able to tolerate then increase the dose to 5 mg subcu weekly.  She will continue metformin with Lantus.  She is working on her diet.  If the blood sugar starts coming down then she will taper off the insulin.  She will definitely benefit from freestyle peg sensor system, prescription will be sent.  She is advised to always keep glucose source in case of low blood sugars.  Recommend to continue oral contraceptive pills as pregnancy needs to be planned since all of the medications that she is can be on needs to be stopped before she becomes pregnant.    Hypertension: Well controlled. Continue lisinopril.       Assessment and plan from February 27, 2024 reviewed and updated.    Ajith Aguayo MD FACE.

## 2024-09-11 ENCOUNTER — TELEPHONE (OUTPATIENT)
Dept: ENDOCRINOLOGY | Facility: CLINIC | Age: 34
End: 2024-09-11
Payer: MEDICAID

## 2024-09-11 DIAGNOSIS — Z79.4 TYPE 2 DIABETES MELLITUS WITH HYPERGLYCEMIA, WITH LONG-TERM CURRENT USE OF INSULIN: Primary | ICD-10-CM

## 2024-09-11 DIAGNOSIS — E11.65 TYPE 2 DIABETES MELLITUS WITH HYPERGLYCEMIA, WITH LONG-TERM CURRENT USE OF INSULIN: Primary | ICD-10-CM

## 2024-09-12 RX ORDER — ACYCLOVIR 400 MG/1
1 TABLET ORAL DAILY
Qty: 1 EACH | Refills: 0 | Status: SHIPPED | OUTPATIENT
Start: 2024-09-12

## 2024-09-12 RX ORDER — ACYCLOVIR 400 MG/1
1 TABLET ORAL
Qty: 9 EACH | Refills: 1 | Status: SHIPPED | OUTPATIENT
Start: 2024-09-12

## 2024-09-17 RX ORDER — SEMAGLUTIDE 1.34 MG/ML
INJECTION, SOLUTION SUBCUTANEOUS
Qty: 2 ML | Refills: 6 | Status: SHIPPED | OUTPATIENT
Start: 2024-09-17

## 2024-09-27 DIAGNOSIS — E11.65 TYPE 2 DIABETES MELLITUS WITH HYPERGLYCEMIA, WITH LONG-TERM CURRENT USE OF INSULIN: Primary | ICD-10-CM

## 2024-09-27 DIAGNOSIS — Z79.4 TYPE 2 DIABETES MELLITUS WITH HYPERGLYCEMIA, WITH LONG-TERM CURRENT USE OF INSULIN: Primary | ICD-10-CM

## 2024-10-29 ENCOUNTER — TELEPHONE (OUTPATIENT)
Dept: ENDOCRINOLOGY | Facility: CLINIC | Age: 34
End: 2024-10-29
Payer: MEDICAID

## 2024-10-29 DIAGNOSIS — E11.65 TYPE 2 DIABETES MELLITUS WITH HYPERGLYCEMIA, WITH LONG-TERM CURRENT USE OF INSULIN: Primary | ICD-10-CM

## 2024-10-29 DIAGNOSIS — Z79.4 TYPE 2 DIABETES MELLITUS WITH HYPERGLYCEMIA, WITH LONG-TERM CURRENT USE OF INSULIN: Primary | ICD-10-CM

## 2024-10-29 RX ORDER — SEMAGLUTIDE 1.34 MG/ML
INJECTION, SOLUTION SUBCUTANEOUS
Qty: 2 ML | Refills: 6 | Status: SHIPPED | OUTPATIENT
Start: 2024-10-29

## 2025-01-10 DIAGNOSIS — Z79.4 TYPE 2 DIABETES MELLITUS WITH HYPERGLYCEMIA, WITH LONG-TERM CURRENT USE OF INSULIN: ICD-10-CM

## 2025-01-10 DIAGNOSIS — E11.65 TYPE 2 DIABETES MELLITUS WITH HYPERGLYCEMIA, WITH LONG-TERM CURRENT USE OF INSULIN: ICD-10-CM

## 2025-01-13 RX ORDER — SEMAGLUTIDE 1.34 MG/ML
INJECTION, SOLUTION SUBCUTANEOUS
Qty: 2 ML | Refills: 6 | Status: SHIPPED | OUTPATIENT
Start: 2025-01-13

## 2025-03-01 DIAGNOSIS — Z79.4 TYPE 2 DIABETES MELLITUS WITH HYPERGLYCEMIA, WITH LONG-TERM CURRENT USE OF INSULIN: ICD-10-CM

## 2025-03-01 DIAGNOSIS — E11.65 TYPE 2 DIABETES MELLITUS WITH HYPERGLYCEMIA, WITH LONG-TERM CURRENT USE OF INSULIN: ICD-10-CM

## 2025-03-04 RX ORDER — ACYCLOVIR 400 MG/1
TABLET ORAL
Qty: 9 EACH | Refills: 2 | Status: SHIPPED | OUTPATIENT
Start: 2025-03-04

## 2025-04-15 ENCOUNTER — LAB (OUTPATIENT)
Dept: ENDOCRINOLOGY | Facility: CLINIC | Age: 35
End: 2025-04-15
Payer: MEDICAID

## 2025-04-15 DIAGNOSIS — E11.65 TYPE 2 DIABETES MELLITUS WITH HYPERGLYCEMIA, WITH LONG-TERM CURRENT USE OF INSULIN: ICD-10-CM

## 2025-04-15 DIAGNOSIS — Z79.4 TYPE 2 DIABETES MELLITUS WITH HYPERGLYCEMIA, WITH LONG-TERM CURRENT USE OF INSULIN: ICD-10-CM

## 2025-04-16 LAB
ALBUMIN SERPL-MCNC: 4.3 G/DL (ref 3.9–4.9)
ALBUMIN/CREAT UR: <3 MG/G CREAT (ref 0–29)
ALP SERPL-CCNC: 118 IU/L (ref 44–121)
ALT SERPL-CCNC: 13 IU/L (ref 0–32)
AST SERPL-CCNC: 13 IU/L (ref 0–40)
BILIRUB SERPL-MCNC: <0.2 MG/DL (ref 0–1.2)
BUN SERPL-MCNC: 10 MG/DL (ref 6–20)
BUN/CREAT SERPL: 14 (ref 9–23)
CALCIUM SERPL-MCNC: 9.4 MG/DL (ref 8.7–10.2)
CHLORIDE SERPL-SCNC: 101 MMOL/L (ref 96–106)
CHOLEST SERPL-MCNC: 195 MG/DL (ref 100–199)
CO2 SERPL-SCNC: 21 MMOL/L (ref 20–29)
CREAT SERPL-MCNC: 0.71 MG/DL (ref 0.57–1)
CREAT UR-MCNC: 87 MG/DL
EGFRCR SERPLBLD CKD-EPI 2021: 114 ML/MIN/1.73
GLOBULIN SER CALC-MCNC: 2.8 G/DL (ref 1.5–4.5)
GLUCOSE SERPL-MCNC: 113 MG/DL (ref 70–99)
HBA1C MFR BLD: 6.1 % (ref 4.8–5.6)
HDLC SERPL-MCNC: 59 MG/DL
LDLC SERPL CALC-MCNC: 101 MG/DL (ref 0–99)
MICROALBUMIN UR-MCNC: <3 UG/ML
POTASSIUM SERPL-SCNC: 4.2 MMOL/L (ref 3.5–5.2)
PROT SERPL-MCNC: 7.1 G/DL (ref 6–8.5)
SODIUM SERPL-SCNC: 139 MMOL/L (ref 134–144)
TRIGL SERPL-MCNC: 208 MG/DL (ref 0–149)
VLDLC SERPL CALC-MCNC: 35 MG/DL (ref 5–40)

## 2025-04-22 ENCOUNTER — OFFICE VISIT (OUTPATIENT)
Dept: ENDOCRINOLOGY | Facility: CLINIC | Age: 35
End: 2025-04-22
Payer: MEDICAID

## 2025-04-22 VITALS
HEART RATE: 90 BPM | DIASTOLIC BLOOD PRESSURE: 70 MMHG | OXYGEN SATURATION: 99 % | WEIGHT: 208 LBS | SYSTOLIC BLOOD PRESSURE: 124 MMHG | BODY MASS INDEX: 33.43 KG/M2 | HEIGHT: 66 IN

## 2025-04-22 DIAGNOSIS — Z79.4 TYPE 2 DIABETES MELLITUS WITH HYPERGLYCEMIA, WITH LONG-TERM CURRENT USE OF INSULIN: Primary | ICD-10-CM

## 2025-04-22 DIAGNOSIS — I10 ESSENTIAL HYPERTENSION: ICD-10-CM

## 2025-04-22 DIAGNOSIS — E66.09 CLASS 1 OBESITY DUE TO EXCESS CALORIES WITH SERIOUS COMORBIDITY AND BODY MASS INDEX (BMI) OF 33.0 TO 33.9 IN ADULT: ICD-10-CM

## 2025-04-22 DIAGNOSIS — E11.65 TYPE 2 DIABETES MELLITUS WITH HYPERGLYCEMIA, WITH LONG-TERM CURRENT USE OF INSULIN: Primary | ICD-10-CM

## 2025-04-22 DIAGNOSIS — E78.2 MIXED HYPERLIPIDEMIA: ICD-10-CM

## 2025-04-22 DIAGNOSIS — E66.811 CLASS 1 OBESITY DUE TO EXCESS CALORIES WITH SERIOUS COMORBIDITY AND BODY MASS INDEX (BMI) OF 33.0 TO 33.9 IN ADULT: ICD-10-CM

## 2025-04-22 PROCEDURE — 1160F RVW MEDS BY RX/DR IN RCRD: CPT | Performed by: INTERNAL MEDICINE

## 2025-04-22 PROCEDURE — 99214 OFFICE O/P EST MOD 30 MIN: CPT | Performed by: INTERNAL MEDICINE

## 2025-04-22 PROCEDURE — 1159F MED LIST DOCD IN RCRD: CPT | Performed by: INTERNAL MEDICINE

## 2025-04-22 PROCEDURE — 3078F DIAST BP <80 MM HG: CPT | Performed by: INTERNAL MEDICINE

## 2025-04-22 PROCEDURE — 3044F HG A1C LEVEL LT 7.0%: CPT | Performed by: INTERNAL MEDICINE

## 2025-04-22 PROCEDURE — 3074F SYST BP LT 130 MM HG: CPT | Performed by: INTERNAL MEDICINE

## 2025-04-22 NOTE — PATIENT INSTRUCTIONS
DC Ozempic after you run out of your current supply and start Trulicity at 0.75 mg subcu weekly  Continue rest of the medication  Continue to work on your diet and activity  Labs before follow-up.

## 2025-04-22 NOTE — PROGRESS NOTES
Endocrine Progress Note Outpatient     Patient Care Team:  Penelope Gomez APRN as PCP - General (Family Medicine)    Chief Complaint: Follow-up type 2 diabetes    HPI: This is a 34-year-old female was initially seen here for gestational diabetes she did deliver back in November 2023.  She is now have developed type 2 diabetes with A1c about 10%.      She is now on metformin  mg twice a day along with Ozempic at 0.5 mg subcu weekly.  She is tolerating those medications well.  She is not on insulin.  She has last almost 17 pounds of weight since last seen.      She is not pursuing pregnancy at this time.  She is using oral contraceptive pills at this time.    Hypertension: She is currently on lisinopril.      Past Medical History:   Diagnosis Date    Allergies     Anxiety     Chlamydia     2012, 2013, 2014    Gestational diabetes     Gestational diabetes mellitus (GDM), delivered 05/30/2023    Hypertension     Migraine     Pre-diabetes        Social History     Socioeconomic History    Marital status: Single    Number of children: 2    Years of education: 12   Tobacco Use    Smoking status: Former    Smokeless tobacco: Never   Vaping Use    Vaping status: Never Used   Substance and Sexual Activity    Alcohol use: Not Currently    Drug use: Never    Sexual activity: Defer       Family History   Problem Relation Age of Onset    Hypertension Mother     Hypertension Maternal Aunt     Alcohol abuse Maternal Uncle     Hypertension Maternal Uncle     Alcohol abuse Maternal Grandmother     Hypertension Maternal Grandmother     Diabetes Maternal Grandmother     Uterine cancer Maternal Grandmother     Hypertension Maternal Grandfather        Allergies   Allergen Reactions    Cat Dander Itching and Other (See Comments)       ROS:   Constitutional:  Denies fatigue, tiredness.    Eyes:  Denies change in visual acuity   HENT:  Denies nasal congestion or sore throat   Respiratory: denies cough, shortness of breath.    Cardiovascular:  denies chest pain, edema   GI:  Denies abdominal pain, nausea, vomiting.   Musculoskeletal:  Denies back pain or joint pain   Integument:  Denies dry skin and rash   Neurologic:  Denies headache, focal weakness or sensory changes   Endocrine:  Denies polyuria or polydipsia   Psychiatric:  Denies depression or anxiety      Vitals:    04/22/25 1446   BP: 124/70   Pulse: 90   SpO2: 99%       Body mass index is 33.57 kg/m².     Physical Exam:  GEN: NAD, conversant  EYES: EOMI,  NECK: no thyromegaly,  CV: RRR,  LUNG: CTA  PSYCH: AOX3, appropriate mood, affect normal      Results Review:     I reviewed the patient's new clinical results.    Lab Results   Component Value Date    HGBA1C 6.1 (H) 04/15/2025    HGBA1C 10.10 (H) 09/03/2024    HGBA1C 6.20 (H) 11/28/2023      Lab Results   Component Value Date    GLUCOSE 113 (H) 04/15/2025    BUN 10 04/15/2025    CREATININE 0.71 04/15/2025    EGFRIFAFRI >60 01/10/2023    BCR 14 04/15/2025    K 4.2 04/15/2025    CO2 21 04/15/2025    CALCIUM 9.4 04/15/2025    ALBUMIN 4.3 04/15/2025    LABIL2 1.3 08/02/2022    AST 13 04/15/2025    ALT 13 04/15/2025    TRIG 208 (H) 04/15/2025     (H) 04/15/2025    HDL 59 04/15/2025     Lab Results   Component Value Date    TSH 0.805 01/28/2021       Medication Review: Reviewed.       Current Outpatient Medications:     Blood Glucose Monitoring Suppl (Accu-Chek Guide) w/Device kit, 1 Device Daily., Disp: 1 kit, Rfl: 0    cetirizine (zyrTEC) 10 MG tablet, TAKE 1 TABLET BY MOUTH EVERY DAY AS NEEDED for allergies or Rhinitis, Disp: , Rfl:     Continuous Glucose  (Dexcom G7 ) device, Use 1 each Daily. Used to check bs 365 days. Dx: E11.65, Disp: 1 each, Rfl: 0    Continuous Glucose Sensor (Dexcom G7 Sensor) misc, USE TO CHECK BLOOD SUGAR. Change every 10 days., Disp: 9 each, Rfl: 2    ferrous sulfate 325 (65 FE) MG EC tablet, TAKE 1 TABLET BY MOUTH TWICE DAILY ON EMPTY STOMACHWITH SOMETHING ACIDIC, Disp: , Rfl:      Insulin Pen Needle (Pen Needles) 32G X 4 MM misc, 1 each 2 (Two) Times a Day., Disp: 90 each, Rfl: 7    lisinopril (PRINIVIL,ZESTRIL) 20 MG tablet, Take 1 tablet by mouth Daily., Disp: , Rfl:     Lo Loestrin Fe 1 MG-10 MCG / 10 MCG tablet, Take 1 tablet by mouth Daily., Disp: , Rfl:     metFORMIN (GLUCOPHAGE) 500 MG tablet, Take 1 tablet by mouth 2 (Two) Times a Day With Meals., Disp: 80 tablet, Rfl: 1    omeprazole (priLOSEC) 40 MG capsule, Take 1 capsule by mouth Daily., Disp: , Rfl:     pantoprazole (PROTONIX) 20 MG EC tablet, Take 1 tablet by mouth Daily., Disp: , Rfl:     Semaglutide,0.25 or 0.5MG/DOS, (Ozempic, 0.25 or 0.5 MG/DOSE,) 2 MG/1.5ML solution pen-injector, 0.25 mg subcu weekly for 4 weeks then increase to 0.5 mg subcu weekly if tolerated., Disp: 2 mL, Rfl: 6      Assessment and plan:  Diabetes mellitus type 2 with hyperglycemia: Well-controlled with A1c at 6.1% but she is having significant constipation since she started Ozempic.  She has tried everything to help but nothing is working at this time.  I will DC Ozempic and try Trulicity at 0.75 mg subcu weekly.  She will continue metformin.  Patient is not breast-feeding at this time.    Hypertension: Well controlled. Continue lisinopril.     Mixed hyperlipidemia: We talked about adding medication but she would like to hold off it for now.  She will continue to work on her diet.    Class I obesity: BMI is 33.6, encouraged to continue to work on diet and activity.    Assessment and plan from September 10, 2024 reviewed and updated.      Ajith Aguayo MD FACE.

## 2025-04-28 ENCOUNTER — PATIENT MESSAGE (OUTPATIENT)
Dept: ENDOCRINOLOGY | Facility: CLINIC | Age: 35
End: 2025-04-28
Payer: MEDICAID

## 2025-04-29 ENCOUNTER — TELEPHONE (OUTPATIENT)
Dept: ENDOCRINOLOGY | Facility: CLINIC | Age: 35
End: 2025-04-29
Payer: MEDICAID